# Patient Record
Sex: FEMALE
[De-identification: names, ages, dates, MRNs, and addresses within clinical notes are randomized per-mention and may not be internally consistent; named-entity substitution may affect disease eponyms.]

---

## 2022-01-20 PROBLEM — Z00.00 ENCOUNTER FOR PREVENTIVE HEALTH EXAMINATION: Status: ACTIVE | Noted: 2022-01-20

## 2022-02-28 ENCOUNTER — APPOINTMENT (OUTPATIENT)
Dept: ORTHOPEDIC SURGERY | Facility: CLINIC | Age: 77
End: 2022-02-28

## 2022-02-28 DIAGNOSIS — M25.562 PAIN IN LEFT KNEE: ICD-10-CM

## 2022-04-27 ENCOUNTER — APPOINTMENT (OUTPATIENT)
Dept: ORTHOPEDIC SURGERY | Facility: CLINIC | Age: 77
End: 2022-04-27

## 2022-10-10 ENCOUNTER — APPOINTMENT (OUTPATIENT)
Dept: ORTHOPEDIC SURGERY | Facility: CLINIC | Age: 77
End: 2022-10-10

## 2022-10-10 DIAGNOSIS — M17.0 BILATERAL PRIMARY OSTEOARTHRITIS OF KNEE: ICD-10-CM

## 2022-10-10 DIAGNOSIS — M17.10 UNILATERAL PRIMARY OSTEOARTHRITIS, UNSPECIFIED KNEE: ICD-10-CM

## 2022-10-10 PROCEDURE — 73562 X-RAY EXAM OF KNEE 3: CPT | Mod: 50

## 2022-10-10 PROCEDURE — 99204 OFFICE O/P NEW MOD 45 MIN: CPT

## 2022-10-10 RX ORDER — LIDOCAINE 5% 700 MG/1
5 PATCH TOPICAL
Qty: 30 | Refills: 0 | Status: ACTIVE | COMMUNITY
Start: 2022-10-10 | End: 1900-01-01

## 2022-10-10 NOTE — HISTORY OF PRESENT ILLNESS
[de-identified] : Patient comes in today with her healthcare worker I did talk to the daughter on the phone for 20 minutes today weather has pain in the left knee greater than right knee she has had gel injections with no relief history of diabetes 2 cardiac stents, a injury over a year ago to the tendon of her knee probably quadriceps did not have surgery for this and this was in [place]\par \par  left knee actively extends 25° short of full extension flexion to 110° stable diffuse pain\par \par  x-rays show bone-on-bone lateral compartment of the left knee near bone on bone medial compartment of the right knee patellofemoral spurring on the left greater than right taken today in the office\par \par  they are interested in knee replacements it did discuss with him some of the issues of infection dislocation DVT as well as death this a their family with this since another relative had a knee replacement done, also recommend a get medical and cardiac optimization and evaluation, we did talk about the revisit in a gel injections so will send out for authorization for that he would like both knees done will do a series of 3 Synvisc, they also on lido  patches were sent to the pharmacy, anti-inflammatories as per primary care doctor, the follow-up with our joint specialist for discussion for left knee replacement\par \par

## 2022-12-20 ENCOUNTER — APPOINTMENT (OUTPATIENT)
Dept: ORTHOPEDIC SURGERY | Facility: CLINIC | Age: 77
End: 2022-12-20

## 2023-01-03 ENCOUNTER — APPOINTMENT (OUTPATIENT)
Dept: ORTHOPEDIC SURGERY | Facility: CLINIC | Age: 78
End: 2023-01-03

## 2023-01-10 ENCOUNTER — APPOINTMENT (OUTPATIENT)
Dept: ORTHOPEDIC SURGERY | Facility: CLINIC | Age: 78
End: 2023-01-10

## 2023-01-17 ENCOUNTER — APPOINTMENT (OUTPATIENT)
Dept: ORTHOPEDIC SURGERY | Facility: CLINIC | Age: 78
End: 2023-01-17

## 2023-04-10 ENCOUNTER — RX RENEWAL (OUTPATIENT)
Age: 78
End: 2023-04-10

## 2023-04-10 DIAGNOSIS — E11.65 TYPE 2 DIABETES MELLITUS WITH HYPERGLYCEMIA: ICD-10-CM

## 2023-04-10 RX ORDER — ALCOHOL ANTISEPTIC PADS
33G X 5 MM PADS, MEDICATED (EA) TOPICAL
Qty: 100 | Refills: 2 | Status: ACTIVE | COMMUNITY
Start: 2023-04-10 | End: 1900-01-01

## 2023-04-10 RX ORDER — PEN NEEDLE, DIABETIC 32GX 5/32"
33G X 5 MM NEEDLE, DISPOSABLE MISCELLANEOUS
Qty: 100 | Refills: 2 | Status: ACTIVE | COMMUNITY
Start: 2023-04-10 | End: 1900-01-01

## 2024-02-06 ENCOUNTER — APPOINTMENT (OUTPATIENT)
Dept: ORTHOPEDIC SURGERY | Facility: CLINIC | Age: 79
End: 2024-02-06
Payer: MEDICARE

## 2024-02-06 VITALS — HEIGHT: 62 IN | BODY MASS INDEX: 23.92 KG/M2 | WEIGHT: 130 LBS

## 2024-02-06 DIAGNOSIS — M65.4 RADIAL STYLOID TENOSYNOVITIS [DE QUERVAIN]: ICD-10-CM

## 2024-02-06 PROCEDURE — 73110 X-RAY EXAM OF WRIST: CPT | Mod: LT

## 2024-02-06 PROCEDURE — 99203 OFFICE O/P NEW LOW 30 MIN: CPT | Mod: 25

## 2024-02-06 RX ORDER — OXYBUTYNIN CHLORIDE 2.5 MG/1
TABLET ORAL
Refills: 0 | Status: ACTIVE | COMMUNITY

## 2024-02-06 RX ORDER — ATORVASTATIN CALCIUM 10 MG/1
10 TABLET, FILM COATED ORAL
Refills: 0 | Status: ACTIVE | COMMUNITY

## 2024-02-06 NOTE — IMAGING
[de-identified] : L wrist Mild swelling Tender first dorsal comp Decreased thumb and wrist ROM +Finklesteins No tenderness of the scaphoid.  No tenderness of distal radius.  X-rays of left wrist taken in the office today moderate osteoarthritic changes appreciated throughout.  No acute fractures, subluxations, or dislocations.

## 2024-02-06 NOTE — DISCUSSION/SUMMARY
[de-identified] : My clinical suspicion is high for Dequervain tenosynovitis of the left wrist given the patient's history, physical examination findings and x-ray findings.  She is also probably having a flareup of arthritis of her left wrist as well.  For these reasons, I gave her left thumb spica wrist brace in the office today that was utilized at all times specially when performing motions that which worsen her pain.  She may remove it for showering.  Meloxicam sent to patient pharmacy to be taken as needed for pain.  Confirmed no contraindication to NSAIDs.  Risks and benefits discussed.  Patient expresses full understanding.  Encouraged the patient to rest, ice, compress, and elevate the left wrist moving forwards.  Encouraged very gentle range of motion activity modification.  All questions and concerns addressed to patient's satisfaction. Patient expresses full understanding of treatment plan. I have the patient follow-up with Dr. Veliz in 4 to 6 weeks for repeat evaluation and treatment.  Consider cortisone injections and follow-up visit if patient's symptoms do not improve.

## 2024-02-06 NOTE — HISTORY OF PRESENT ILLNESS
[de-identified] : 78-year-old female here for evaluation of left wrist pain.  Patient reports a gradual aching pain to the first dorsal compartment of the left wrist that which worsens with lifting motions.  She denies any numbness or tingling.  Denies any trauma or falls.

## 2024-03-06 ENCOUNTER — APPOINTMENT (OUTPATIENT)
Dept: ORTHOPEDIC SURGERY | Facility: CLINIC | Age: 79
End: 2024-03-06

## 2024-04-17 ENCOUNTER — APPOINTMENT (OUTPATIENT)
Dept: ORTHOPEDIC SURGERY | Facility: CLINIC | Age: 79
End: 2024-04-17

## 2024-05-28 ENCOUNTER — APPOINTMENT (OUTPATIENT)
Dept: ORTHOPEDIC SURGERY | Facility: CLINIC | Age: 79
End: 2024-05-28

## 2024-10-22 ENCOUNTER — OUTPATIENT (OUTPATIENT)
Dept: OUTPATIENT SERVICES | Facility: HOSPITAL | Age: 79
LOS: 1 days | End: 2024-10-22
Payer: MEDICARE

## 2024-10-22 ENCOUNTER — APPOINTMENT (OUTPATIENT)
Dept: CT IMAGING | Facility: HOSPITAL | Age: 79
End: 2024-10-22

## 2024-10-22 DIAGNOSIS — M17.11 UNILATERAL PRIMARY OSTEOARTHRITIS, RIGHT KNEE: ICD-10-CM

## 2024-10-22 PROCEDURE — 73700 CT LOWER EXTREMITY W/O DYE: CPT | Mod: 26,RT

## 2024-10-22 PROCEDURE — 73700 CT LOWER EXTREMITY W/O DYE: CPT

## 2024-10-28 ENCOUNTER — OUTPATIENT (OUTPATIENT)
Dept: OUTPATIENT SERVICES | Facility: HOSPITAL | Age: 79
LOS: 1 days | End: 2024-10-28
Payer: MEDICARE

## 2024-10-28 VITALS
HEIGHT: 62 IN | DIASTOLIC BLOOD PRESSURE: 67 MMHG | RESPIRATION RATE: 16 BRPM | HEART RATE: 68 BPM | TEMPERATURE: 98 F | SYSTOLIC BLOOD PRESSURE: 153 MMHG | WEIGHT: 136.03 LBS | OXYGEN SATURATION: 97 %

## 2024-10-28 DIAGNOSIS — M17.11 UNILATERAL PRIMARY OSTEOARTHRITIS, RIGHT KNEE: ICD-10-CM

## 2024-10-28 DIAGNOSIS — Z90.710 ACQUIRED ABSENCE OF BOTH CERVIX AND UTERUS: Chronic | ICD-10-CM

## 2024-10-28 DIAGNOSIS — E11.9 TYPE 2 DIABETES MELLITUS WITHOUT COMPLICATIONS: ICD-10-CM

## 2024-10-28 LAB — BLD GP AB SCN SERPL QL: SIGNIFICANT CHANGE UP

## 2024-10-28 RX ORDER — POVIDONE-IODINE 0.07 MG/ML
1 SOLUTION TOPICAL ONCE
Refills: 0 | Status: COMPLETED | OUTPATIENT
Start: 2024-10-29 | End: 2024-10-29

## 2024-10-28 NOTE — H&P PST ADULT - NSANTHOSAYNRD_GEN_A_CORE
No. PRABHA screening performed.  STOP BANG Legend: 0-2 = LOW Risk; 3-4 = INTERMEDIATE Risk; 5-8 = HIGH Risk

## 2024-10-28 NOTE — H&P PST ADULT - MUSCULOSKELETAL COMMENTS
uses walker and cane for ambulation Darius Assisted Right Total Knee Replacement on 10/29/24 with Dr Salazar

## 2024-10-28 NOTE — H&P PST ADULT - PROBLEM SELECTOR PLAN 2
pt instructed to not take any diabetic medication on day of surgery   and to only take half dose of night time insulin night before surgery

## 2024-10-28 NOTE — H&P PST ADULT - ASSESSMENT
79 y.o female with PMHx for DM and Osteoporosis and Arthritis . Now with c/o of worsening right knee pain. on w/u found to have unilateral primary Osteoarthritis right knee and now for schedule Darius Assisted Right Total Knee Replacement on 10/29/24 with Dr Salazar 79 y.o female with  unilateral primary Osteoarthritis right knee and now for schedule Darius Assisted Right Total Knee Replacement on 10/29/24 with Dr Martin PATEL 1

## 2024-10-28 NOTE — H&P PST ADULT - MUSCULOSKELETAL
decreased ROM/decreased ROM due to pain/strength 5/5 bilateral upper extremities details… decreased ROM/decreased ROM due to pain/strength 5/5 bilateral upper extremities/decreased strength

## 2024-10-28 NOTE — H&P PST ADULT - PROBLEM SELECTOR PLAN 1
Pt schedule for Darius Assisted Right Total Knee Replacement on 10/29/24 with Dr Salazar    Labs drawn in PCP - will /fu report   Pt was seen by PCP for Medical clearance- will f/u report     pt was swabbed for MRSA/MSSA -will f/u with result   Pt Instructed on use of Mupirocin if nasal swab positive    Pt was  instructed to stop aspirin/ecotrin and all over the counter medication including vitamins and herbal supplements one week prior to surgery   Instructions given on the use of 4% chlorhexidine wash and Pt verbalized understanding of same   Pt Instructed to have nothing by mouth starting midnight day before surgery  Patient is to expect a phone call day before surgery between the hours of 430- 630pm giving arrival time for surgery   Written and verbal preoperative instructions given to patient with understanding verbalized.   Post surgery instruction given by Edil, booklets and pamphlets also given    Patient today with STOP bang score 3  Low  risk for PRABHA Pt schedule for Darius Assisted Right Total Knee Replacement on 10/29/24 with Dr Salazar    Labs drawn in PCP - will /fu report   Pt was seen by PCP for Medical clearance- will f/u report     pt was swabbed for MRSA/MSSA -will f/u with result   Pt Instructed on use of Mupirocin if nasal swab positive    Pt was  instructed to stop aspirin/ecotrin and all over the counter medication including vitamins and herbal supplements one week prior to surgery   Instructions given on the use of 4% chlorhexidine wash and Pt verbalized understanding of same   Pt Instructed to have nothing by mouth starting midnight day before surgery  Patient is to expect a phone call day before surgery between the hours of 430- 630pm giving arrival time for surgery   Written and verbal preoperative instructions given to patient and daughter Mer  with understanding verbalized  with daughter present .  Post surgery instruction given by Edil, booklets and pamphlets also given    Patient today with STOP bang score 1  Low  risk for PRABHA

## 2024-10-28 NOTE — H&P PST ADULT - HISTORY OF PRESENT ILLNESS
79 y.o female with PMHx for DM and Osteoporosis and Arthritis . Now with c/o of worsening right knee pain. on w/u found to have unilateral primary Osteoarthritis right knee and now for schedule Darius Assisted Right Total Knee Replacement on 10/29/24 with Dr Salazar

## 2024-10-29 ENCOUNTER — TRANSCRIPTION ENCOUNTER (OUTPATIENT)
Age: 79
End: 2024-10-29

## 2024-10-29 ENCOUNTER — INPATIENT (INPATIENT)
Facility: HOSPITAL | Age: 79
LOS: 2 days | Discharge: HOME HEALTH | DRG: 469 | End: 2024-11-01
Attending: ORTHOPAEDIC SURGERY | Admitting: ORTHOPAEDIC SURGERY
Payer: MEDICARE

## 2024-10-29 VITALS
WEIGHT: 136.03 LBS | DIASTOLIC BLOOD PRESSURE: 75 MMHG | RESPIRATION RATE: 16 BRPM | HEART RATE: 63 BPM | OXYGEN SATURATION: 98 % | TEMPERATURE: 98 F | SYSTOLIC BLOOD PRESSURE: 159 MMHG | HEIGHT: 62 IN

## 2024-10-29 DIAGNOSIS — M19.90 UNSPECIFIED OSTEOARTHRITIS, UNSPECIFIED SITE: ICD-10-CM

## 2024-10-29 DIAGNOSIS — E11.9 TYPE 2 DIABETES MELLITUS WITHOUT COMPLICATIONS: ICD-10-CM

## 2024-10-29 DIAGNOSIS — G89.18 OTHER ACUTE POSTPROCEDURAL PAIN: ICD-10-CM

## 2024-10-29 DIAGNOSIS — Z90.710 ACQUIRED ABSENCE OF BOTH CERVIX AND UTERUS: Chronic | ICD-10-CM

## 2024-10-29 DIAGNOSIS — M17.11 UNILATERAL PRIMARY OSTEOARTHRITIS, RIGHT KNEE: ICD-10-CM

## 2024-10-29 DIAGNOSIS — Z96.651 PRESENCE OF RIGHT ARTIFICIAL KNEE JOINT: ICD-10-CM

## 2024-10-29 LAB
A1C WITH ESTIMATED AVERAGE GLUCOSE RESULT: 8.7 % — HIGH (ref 4–5.6)
ANION GAP SERPL CALC-SCNC: 5 MMOL/L — SIGNIFICANT CHANGE UP (ref 5–17)
BLD GP AB SCN SERPL QL: SIGNIFICANT CHANGE UP
BUN SERPL-MCNC: 24 MG/DL — HIGH (ref 7–18)
CALCIUM SERPL-MCNC: 8.5 MG/DL — SIGNIFICANT CHANGE UP (ref 8.4–10.5)
CHLORIDE SERPL-SCNC: 111 MMOL/L — HIGH (ref 96–108)
CO2 SERPL-SCNC: 25 MMOL/L — SIGNIFICANT CHANGE UP (ref 22–31)
CREAT SERPL-MCNC: 0.94 MG/DL — SIGNIFICANT CHANGE UP (ref 0.5–1.3)
EGFR: 62 ML/MIN/1.73M2 — SIGNIFICANT CHANGE UP
ESTIMATED AVERAGE GLUCOSE: 203 MG/DL — HIGH (ref 68–114)
GLUCOSE BLDC GLUCOMTR-MCNC: 113 MG/DL — HIGH (ref 70–99)
GLUCOSE BLDC GLUCOMTR-MCNC: 142 MG/DL — HIGH (ref 70–99)
GLUCOSE BLDC GLUCOMTR-MCNC: 243 MG/DL — HIGH (ref 70–99)
GLUCOSE BLDC GLUCOMTR-MCNC: 254 MG/DL — HIGH (ref 70–99)
GLUCOSE BLDC GLUCOMTR-MCNC: 289 MG/DL — HIGH (ref 70–99)
GLUCOSE SERPL-MCNC: 118 MG/DL — HIGH (ref 70–99)
HCT VFR BLD CALC: 29.8 % — LOW (ref 34.5–45)
HGB BLD-MCNC: 10.2 G/DL — LOW (ref 11.5–15.5)
MCHC RBC-ENTMCNC: 29.9 PG — SIGNIFICANT CHANGE UP (ref 27–34)
MCHC RBC-ENTMCNC: 34.2 G/DL — SIGNIFICANT CHANGE UP (ref 32–36)
MCV RBC AUTO: 87.4 FL — SIGNIFICANT CHANGE UP (ref 80–100)
MRSA PCR RESULT.: SIGNIFICANT CHANGE UP
NRBC # BLD: 0 /100 WBCS — SIGNIFICANT CHANGE UP (ref 0–0)
PLATELET # BLD AUTO: 135 K/UL — LOW (ref 150–400)
POTASSIUM SERPL-MCNC: 3.8 MMOL/L — SIGNIFICANT CHANGE UP (ref 3.5–5.3)
POTASSIUM SERPL-SCNC: 3.8 MMOL/L — SIGNIFICANT CHANGE UP (ref 3.5–5.3)
RBC # BLD: 3.41 M/UL — LOW (ref 3.8–5.2)
RBC # FLD: 13.2 % — SIGNIFICANT CHANGE UP (ref 10.3–14.5)
S AUREUS DNA NOSE QL NAA+PROBE: SIGNIFICANT CHANGE UP
SODIUM SERPL-SCNC: 141 MMOL/L — SIGNIFICANT CHANGE UP (ref 135–145)
WBC # BLD: 5.7 K/UL — SIGNIFICANT CHANGE UP (ref 3.8–10.5)
WBC # FLD AUTO: 5.7 K/UL — SIGNIFICANT CHANGE UP (ref 3.8–10.5)

## 2024-10-29 PROCEDURE — 86901 BLOOD TYPING SEROLOGIC RH(D): CPT

## 2024-10-29 PROCEDURE — 36415 COLL VENOUS BLD VENIPUNCTURE: CPT

## 2024-10-29 PROCEDURE — 87640 STAPH A DNA AMP PROBE: CPT

## 2024-10-29 PROCEDURE — G0463: CPT

## 2024-10-29 PROCEDURE — 83036 HEMOGLOBIN GLYCOSYLATED A1C: CPT

## 2024-10-29 PROCEDURE — 88305 TISSUE EXAM BY PATHOLOGIST: CPT | Mod: 26

## 2024-10-29 PROCEDURE — 86850 RBC ANTIBODY SCREEN: CPT

## 2024-10-29 PROCEDURE — 99222 1ST HOSP IP/OBS MODERATE 55: CPT

## 2024-10-29 PROCEDURE — 87641 MR-STAPH DNA AMP PROBE: CPT

## 2024-10-29 PROCEDURE — 86900 BLOOD TYPING SEROLOGIC ABO: CPT

## 2024-10-29 PROCEDURE — 73560 X-RAY EXAM OF KNEE 1 OR 2: CPT | Mod: 26,RT,77

## 2024-10-29 PROCEDURE — 88311 DECALCIFY TISSUE: CPT | Mod: 26

## 2024-10-29 DEVICE — ARISTA 3GR: Type: IMPLANTABLE DEVICE | Status: FUNCTIONAL

## 2024-10-29 DEVICE — COMP FEM TRIATHLON PS SZ 2 RT: Type: IMPLANTABLE DEVICE | Status: FUNCTIONAL

## 2024-10-29 DEVICE — FEM DIST FIXATION PEG MOD TKS: Type: IMPLANTABLE DEVICE | Status: FUNCTIONAL

## 2024-10-29 DEVICE — STRYKER PIN 1/8 X 3.5" LONG: Type: IMPLANTABLE DEVICE | Status: FUNCTIONAL

## 2024-10-29 DEVICE — INSERT TIB TS PLUS X3 POLY SZ 3 11MM: Type: IMPLANTABLE DEVICE | Status: FUNCTIONAL

## 2024-10-29 DEVICE — CEMENT SIMPLEX P 40GM: Type: IMPLANTABLE DEVICE | Status: FUNCTIONAL

## 2024-10-29 DEVICE — BASEPLATE TIB UNIV TRIATHLON SZ 3: Type: IMPLANTABLE DEVICE | Status: FUNCTIONAL

## 2024-10-29 DEVICE — MAKO BONE PIN 4MM X 110MM: Type: IMPLANTABLE DEVICE | Status: FUNCTIONAL

## 2024-10-29 DEVICE — MAKO BONE PIN 4MM X 140MM: Type: IMPLANTABLE DEVICE | Status: FUNCTIONAL

## 2024-10-29 DEVICE — COMP PATELLA ASYMMETRIC X3 32X10MM: Type: IMPLANTABLE DEVICE | Status: FUNCTIONAL

## 2024-10-29 RX ORDER — CELECOXIB 100 MG
200 CAPSULE ORAL DAILY
Refills: 0 | Status: DISCONTINUED | OUTPATIENT
Start: 2024-10-30 | End: 2024-11-01

## 2024-10-29 RX ORDER — FAMOTIDINE 10 MG/ML
20 INJECTION INTRAVENOUS EVERY 12 HOURS
Refills: 0 | Status: DISCONTINUED | OUTPATIENT
Start: 2024-10-29 | End: 2024-11-01

## 2024-10-29 RX ORDER — KETOROLAC TROMETHAMINE 30 MG/ML
15 INJECTION INTRAMUSCULAR; INTRAVENOUS EVERY 6 HOURS
Refills: 0 | Status: DISCONTINUED | OUTPATIENT
Start: 2024-10-29 | End: 2024-11-01

## 2024-10-29 RX ORDER — INSULIN GLARGINE,HUM.REC.ANLOG 100/ML
0 VIAL (ML) SUBCUTANEOUS
Refills: 0 | DISCHARGE

## 2024-10-29 RX ORDER — PIOGLITAZONE 45 MG/1
1 TABLET ORAL
Refills: 0 | DISCHARGE

## 2024-10-29 RX ORDER — POLYETHYLENE GLYCOL 3350 17 G/17G
17 POWDER, FOR SOLUTION ORAL AT BEDTIME
Refills: 0 | Status: DISCONTINUED | OUTPATIENT
Start: 2024-10-29 | End: 2024-11-01

## 2024-10-29 RX ORDER — HYDROMORPHONE HCL/0.9% NACL/PF 6 MG/30 ML
0.5 PATIENT CONTROLLED ANALGESIA SYRINGE INTRAVENOUS
Refills: 0 | Status: DISCONTINUED | OUTPATIENT
Start: 2024-10-29 | End: 2024-10-29

## 2024-10-29 RX ORDER — INSULIN LISPRO 100/ML
5 VIAL (ML) SUBCUTANEOUS ONCE
Refills: 0 | Status: DISCONTINUED | OUTPATIENT
Start: 2024-10-29 | End: 2024-10-29

## 2024-10-29 RX ORDER — OXYBUTYNIN CHLORIDE 5 MG/1
5 TABLET ORAL DAILY
Refills: 0 | Status: DISCONTINUED | OUTPATIENT
Start: 2024-10-29 | End: 2024-10-29

## 2024-10-29 RX ORDER — GABAPENTIN 300 MG/1
300 CAPSULE ORAL DAILY
Refills: 0 | Status: DISCONTINUED | OUTPATIENT
Start: 2024-10-29 | End: 2024-10-29

## 2024-10-29 RX ORDER — SENNA 187 MG
2 TABLET ORAL AT BEDTIME
Refills: 0 | Status: DISCONTINUED | OUTPATIENT
Start: 2024-10-29 | End: 2024-11-01

## 2024-10-29 RX ORDER — INSULIN REG, HUM S-S BUFF 100/ML
5 VIAL (ML) INJECTION ONCE
Refills: 0 | Status: DISCONTINUED | OUTPATIENT
Start: 2024-10-29 | End: 2024-10-29

## 2024-10-29 RX ORDER — INSULIN LISPRO 100/ML
5 VIAL (ML) SUBCUTANEOUS ONCE
Refills: 0 | Status: COMPLETED | OUTPATIENT
Start: 2024-10-29 | End: 2024-10-29

## 2024-10-29 RX ORDER — TRAMADOL HYDROCHLORIDE 50 MG/1
50 TABLET, COATED ORAL EVERY 8 HOURS
Refills: 0 | Status: DISCONTINUED | OUTPATIENT
Start: 2024-10-29 | End: 2024-11-01

## 2024-10-29 RX ORDER — B-COMPLEX WITH VITAMIN C
1 VIAL (ML) INJECTION DAILY
Refills: 0 | Status: DISCONTINUED | OUTPATIENT
Start: 2024-10-29 | End: 2024-11-01

## 2024-10-29 RX ORDER — ACETAMINOPHEN 500 MG
1000 TABLET ORAL EVERY 8 HOURS
Refills: 0 | Status: COMPLETED | OUTPATIENT
Start: 2024-10-29 | End: 2024-10-30

## 2024-10-29 RX ORDER — CHLORHEXIDINE GLUCONATE 40 MG/ML
1 SOLUTION TOPICAL ONCE
Refills: 0 | Status: COMPLETED | OUTPATIENT
Start: 2024-10-29 | End: 2024-10-29

## 2024-10-29 RX ORDER — GLUCAGON INJECTION, SOLUTION 1 MG/.2ML
1 INJECTION, SOLUTION SUBCUTANEOUS ONCE
Refills: 0 | Status: DISCONTINUED | OUTPATIENT
Start: 2024-10-29 | End: 2024-11-01

## 2024-10-29 RX ORDER — OXYCODONE HYDROCHLORIDE 30 MG/1
5 TABLET ORAL
Refills: 0 | Status: DISCONTINUED | OUTPATIENT
Start: 2024-10-29 | End: 2024-10-29

## 2024-10-29 RX ORDER — SODIUM CHLORIDE 9 MG/ML
1000 INJECTION, SOLUTION INTRAMUSCULAR; INTRAVENOUS; SUBCUTANEOUS
Refills: 0 | Status: DISCONTINUED | OUTPATIENT
Start: 2024-10-29 | End: 2024-11-01

## 2024-10-29 RX ORDER — ACETAMINOPHEN 500 MG
975 TABLET ORAL ONCE
Refills: 0 | Status: COMPLETED | OUTPATIENT
Start: 2024-10-29 | End: 2024-10-29

## 2024-10-29 RX ORDER — ENOXAPARIN SODIUM 40MG/0.4ML
30 SYRINGE (ML) SUBCUTANEOUS EVERY 12 HOURS
Refills: 0 | Status: DISCONTINUED | OUTPATIENT
Start: 2024-10-29 | End: 2024-11-01

## 2024-10-29 RX ORDER — OXYCODONE HYDROCHLORIDE 30 MG/1
5 TABLET ORAL EVERY 4 HOURS
Refills: 0 | Status: DISCONTINUED | OUTPATIENT
Start: 2024-10-29 | End: 2024-11-01

## 2024-10-29 RX ORDER — ONDANSETRON HYDROCHLORIDE 2 MG/ML
4 INJECTION, SOLUTION INTRAMUSCULAR; INTRAVENOUS ONCE
Refills: 0 | Status: DISCONTINUED | OUTPATIENT
Start: 2024-10-29 | End: 2024-10-29

## 2024-10-29 RX ORDER — HYDROMORPHONE HCL/0.9% NACL/PF 6 MG/30 ML
1 PATIENT CONTROLLED ANALGESIA SYRINGE INTRAVENOUS
Refills: 0 | Status: DISCONTINUED | OUTPATIENT
Start: 2024-10-29 | End: 2024-10-29

## 2024-10-29 RX ORDER — MAGNESIUM HYDROXIDE 1200 MG/15ML
30 SUSPENSION ORAL DAILY
Refills: 0 | Status: DISCONTINUED | OUTPATIENT
Start: 2024-10-29 | End: 2024-11-01

## 2024-10-29 RX ORDER — HYDRALAZINE HYDROCHLORIDE 50 MG/1
5 TABLET, FILM COATED ORAL ONCE
Refills: 0 | Status: COMPLETED | OUTPATIENT
Start: 2024-10-29 | End: 2024-10-29

## 2024-10-29 RX ORDER — ONDANSETRON HYDROCHLORIDE 2 MG/ML
4 INJECTION, SOLUTION INTRAMUSCULAR; INTRAVENOUS EVERY 6 HOURS
Refills: 0 | Status: DISCONTINUED | OUTPATIENT
Start: 2024-10-29 | End: 2024-11-01

## 2024-10-29 RX ORDER — CEFAZOLIN SODIUM 1 G
2000 VIAL (EA) INJECTION EVERY 8 HOURS
Refills: 0 | Status: COMPLETED | OUTPATIENT
Start: 2024-10-29 | End: 2024-10-30

## 2024-10-29 RX ORDER — GABAPENTIN 300 MG/1
0 CAPSULE ORAL
Refills: 0 | DISCHARGE

## 2024-10-29 RX ORDER — FERROUS SULFATE 325(65) MG
325 TABLET ORAL DAILY
Refills: 0 | Status: DISCONTINUED | OUTPATIENT
Start: 2024-10-29 | End: 2024-11-01

## 2024-10-29 RX ORDER — OXYBUTYNIN CHLORIDE 5 MG/1
1 TABLET ORAL
Refills: 0 | DISCHARGE

## 2024-10-29 RX ORDER — CHLORHEXIDINE GLUCONATE 40 MG/ML
1 SOLUTION TOPICAL ONCE
Refills: 0 | Status: DISCONTINUED | OUTPATIENT
Start: 2024-10-29 | End: 2024-10-29

## 2024-10-29 RX ORDER — FOLIC ACID 1 MG/1
1 TABLET ORAL DAILY
Refills: 0 | Status: DISCONTINUED | OUTPATIENT
Start: 2024-10-29 | End: 2024-11-01

## 2024-10-29 RX ORDER — ASCORBIC ACID 500 MG
500 TABLET ORAL
Refills: 0 | Status: DISCONTINUED | OUTPATIENT
Start: 2024-10-29 | End: 2024-11-01

## 2024-10-29 RX ORDER — TRAMADOL HYDROCHLORIDE 50 MG/1
50 TABLET, COATED ORAL ONCE
Refills: 0 | Status: DISCONTINUED | OUTPATIENT
Start: 2024-10-29 | End: 2024-10-29

## 2024-10-29 RX ORDER — INSULIN LISPRO 100/ML
VIAL (ML) SUBCUTANEOUS
Refills: 0 | Status: DISCONTINUED | OUTPATIENT
Start: 2024-10-29 | End: 2024-11-01

## 2024-10-29 RX ORDER — SODIUM CHLORIDE 9 MG/ML
3 INJECTION, SOLUTION INTRAMUSCULAR; INTRAVENOUS; SUBCUTANEOUS EVERY 8 HOURS
Refills: 0 | Status: DISCONTINUED | OUTPATIENT
Start: 2024-10-29 | End: 2024-10-29

## 2024-10-29 RX ADMIN — Medication 4: at 22:23

## 2024-10-29 RX ADMIN — OXYCODONE HYDROCHLORIDE 5 MILLIGRAM(S): 30 TABLET ORAL at 19:17

## 2024-10-29 RX ADMIN — TRAMADOL HYDROCHLORIDE 50 MILLIGRAM(S): 50 TABLET, COATED ORAL at 22:23

## 2024-10-29 RX ADMIN — Medication 1000 MILLIGRAM(S): at 22:23

## 2024-10-29 RX ADMIN — KETOROLAC TROMETHAMINE 15 MILLIGRAM(S): 30 INJECTION INTRAMUSCULAR; INTRAVENOUS at 17:05

## 2024-10-29 RX ADMIN — SODIUM CHLORIDE 3 MILLILITER(S): 9 INJECTION, SOLUTION INTRAMUSCULAR; INTRAVENOUS; SUBCUTANEOUS at 08:31

## 2024-10-29 RX ADMIN — POVIDONE-IODINE 1 APPLICATION(S): 0.07 SOLUTION TOPICAL at 08:31

## 2024-10-29 RX ADMIN — OXYCODONE HYDROCHLORIDE 5 MILLIGRAM(S): 30 TABLET ORAL at 18:17

## 2024-10-29 RX ADMIN — FAMOTIDINE 20 MILLIGRAM(S): 10 INJECTION INTRAVENOUS at 17:10

## 2024-10-29 RX ADMIN — Medication 30 MILLIGRAM(S): at 22:22

## 2024-10-29 RX ADMIN — POLYETHYLENE GLYCOL 3350 17 GRAM(S): 17 POWDER, FOR SOLUTION ORAL at 22:23

## 2024-10-29 RX ADMIN — HYDRALAZINE HYDROCHLORIDE 5 MILLIGRAM(S): 50 TABLET, FILM COATED ORAL at 15:14

## 2024-10-29 RX ADMIN — CHLORHEXIDINE GLUCONATE 1 APPLICATION(S): 40 SOLUTION TOPICAL at 08:35

## 2024-10-29 RX ADMIN — Medication 2 TABLET(S): at 22:23

## 2024-10-29 RX ADMIN — Medication 975 MILLIGRAM(S): at 08:22

## 2024-10-29 RX ADMIN — Medication 500 MILLIGRAM(S): at 17:11

## 2024-10-29 RX ADMIN — SODIUM CHLORIDE 110 MILLILITER(S): 9 INJECTION, SOLUTION INTRAMUSCULAR; INTRAVENOUS; SUBCUTANEOUS at 17:06

## 2024-10-29 RX ADMIN — TRAMADOL HYDROCHLORIDE 50 MILLIGRAM(S): 50 TABLET, COATED ORAL at 08:36

## 2024-10-29 RX ADMIN — Medication 5 UNIT(S): at 09:25

## 2024-10-29 RX ADMIN — Medication 100 MILLIGRAM(S): at 19:14

## 2024-10-29 NOTE — CONSULT NOTE ADULT - SUBJECTIVE AND OBJECTIVE BOX
Source of information: TERENCE RUBY, Chart review  Patient language: English  : n/a    HPI:  Patient is a 79 year old female with a PMHx significant for DM and primary OA of the right knee. Patient is admitted for scheduled Right Total Knee Replacement with Dr. Salazar now POD 0. Pain service consulted for acute postoperative pain. Pt seen and examined at bedside, this afternoon. At time of assessment, patient laying in bed grimacing in pain, reports pain score 10/10 SCALE USED: (1-10 VNRS). Primary RN at bedside to administer PRN pain medication for severe pain as ordered. Pt describes pain as localized to right knee joint constant and throbbing in quality. The pain is currently alleviated by pain medication and is exacerbated by movement. Pt tolerating sips of water. Denies lethargy, chest pain, SOB, nausea, vomiting, constipation. Reports last BM 2 days ago. Patient stated goal for pain control: to be able to take deep breaths, get out of bed to chair and ambulate with tolerable pain control. Pt denies taking medications for pain at home.     PAST MEDICAL & SURGICAL HISTORY:  DM (diabetes mellitus)    OA (osteoarthritis)    H/O total hysterectomy    History of cholecystectomy  FAMILY HISTORY:    Social History:  [x] Denies ETOH use, illicit drug use and smoking    Allergies    No Known Allergies    Intolerances    MEDICATIONS  (STANDING):  acetaminophen     Tablet .. 1000 milliGRAM(s) Oral every 8 hours  ascorbic acid 500 milliGRAM(s) Oral two times a day  ceFAZolin   IVPB 2000 milliGRAM(s) IV Intermittent every 8 hours  dextrose 5%. 1000 milliLiter(s) (50 mL/Hr) IV Continuous <Continuous>  dextrose 5%. 1000 milliLiter(s) (100 mL/Hr) IV Continuous <Continuous>  dextrose 50% Injectable 25 Gram(s) IV Push once  dextrose 50% Injectable 12.5 Gram(s) IV Push once  dextrose 50% Injectable 25 Gram(s) IV Push once  enoxaparin Injectable 30 milliGRAM(s) SubCutaneous every 12 hours  famotidine    Tablet 20 milliGRAM(s) Oral every 12 hours  ferrous    sulfate 325 milliGRAM(s) Oral daily  folic acid 1 milliGRAM(s) Oral daily  glucagon  Injectable 1 milliGRAM(s) IntraMuscular once  insulin lispro (ADMELOG) corrective regimen sliding scale   SubCutaneous three times a day before meals  multivitamin 1 Tablet(s) Oral daily  polyethylene glycol 3350 17 Gram(s) Oral at bedtime  senna 2 Tablet(s) Oral at bedtime  sodium chloride 0.9%. 1000 milliLiter(s) (110 mL/Hr) IV Continuous <Continuous>  traMADol 50 milliGRAM(s) Oral every 8 hours    MEDICATIONS  (PRN):  dextrose Oral Gel 15 Gram(s) Oral once PRN Blood Glucose LESS THAN 70 milliGRAM(s)/deciliter  ketorolac   Injectable 15 milliGRAM(s) IV Push every 6 hours PRN Severe Pain (7 - 10)  magnesium hydroxide Suspension 30 milliLiter(s) Oral daily PRN Constipation  ondansetron Injectable 4 milliGRAM(s) IV Push every 6 hours PRN Nausea and/or Vomiting  oxyCODONE    IR 5 milliGRAM(s) Oral every 4 hours PRN Moderate Pain (4 - 6)    Vital Signs Last 24 Hrs  T(C): 36.6 (29 Oct 2024 16:30), Max: 36.6 (29 Oct 2024 14:43)  T(F): 97.9 (29 Oct 2024 16:30), Max: 97.9 (29 Oct 2024 14:43)  HR: 70 (29 Oct 2024 16:30) (54 - 72)  BP: 155/75 (29 Oct 2024 16:30) (111/77 - 198/89)  BP(mean): 101 (29 Oct 2024 16:30) (85 - 130)  RR: 18 (29 Oct 2024 16:30) (10 - 18)  SpO2: 100% (29 Oct 2024 16:30) (98% - 100%)    Parameters below as of 29 Oct 2024 16:30  Patient On (Oxygen Delivery Method): room air    LABS: Reviewed.                          10.2   5.70  )-----------( 135      ( 29 Oct 2024 13:40 )             29.8     10-29    141  |  111[H]  |  24[H]  ----------------------------<  118[H]  3.8   |  25  |  0.94    Ca    8.5      29 Oct 2024 13:40    Urinalysis Basic - ( 29 Oct 2024 13:40 )    Color: x / Appearance: x / SG: x / pH: x  Gluc: 118 mg/dL / Ketone: x  / Bili: x / Urobili: x   Blood: x / Protein: x / Nitrite: x   Leuk Esterase: x / RBC: x / WBC x   Sq Epi: x / Non Sq Epi: x / Bacteria: x    CAPILLARY BLOOD GLUCOSE    POCT Blood Glucose.: 142 mg/dL (29 Oct 2024 16:46)  POCT Blood Glucose.: 113 mg/dL (29 Oct 2024 13:41)  POCT Blood Glucose.: 254 mg/dL (29 Oct 2024 09:52)  POCT Blood Glucose.: 289 mg/dL (29 Oct 2024 07:35)    Radiology: None to be Reviewed.     ORT Score -   Family Hx of substance abuse	Female	      Male  Alcohol 	                                           1                     3  Illegal drugs	                                   2                     3  Rx drugs                                           4 	                  4  Personal Hx of substance abuse		  Alcohol 	                                          3	                  3  Illegal drugs                                     4	                  4  Rx drugs                                            5 	                  5  Age between 16- 45 years	           1                     1  hx preadolescent sexual abuse	   3 	                  0  Psychological disease		  ADD, OCD, bipolar, schizophrenia   2	          2  Depression                                           1 	          1  Total: 0    a score of 3 or lower indicates low risk for opioid abuse		  a score of 4-7 indicates moderate risk for opioid abuse		  a score of 8 or higher indicates high risk for opioid abuse  	  REVIEW OF SYSTEMS:  CONSTITUTIONAL: No fever or fatigue  HEENT:  No difficulty hearing, no change in vision  NECK: No pain or stiffness  RESPIRATORY: No cough, wheezing, chills or hemoptysis; No shortness of breath  CARDIOVASCULAR: No chest pain, palpitations, dizziness, or leg swelling  GASTROINTESTINAL: No loss of appetite, decreased PO intake. No abdominal or epigastric pain. No nausea, vomiting; No diarrhea or constipation.   GENITOURINARY: No dysuria, frequency, hematuria, retention or incontinence  MUSCULOSKELETAL: + right knee pain; No back pain, no upper or lower motor strength weakness, no saddle anesthesia, bowel/bladder incontinence, no falls   NEURO: No headaches, No numbness/tingling b/l LE, No weakness  ENDOCRINE: No polyuria, polydipsia, heat or cold intolerance; No hair loss  PSYCHIATRIC: No depression, anxiety or difficulty sleeping    PHYSICAL EXAM:  GENERAL:  Alert & Oriented X4, cooperative, NAD, Good concentration  RESPIRATORY: Respirations even and unlabored. Clear to auscultation bilaterally  CARDIOVASCULAR: Normal S1/S2, regular rate and rhythm  GASTROINTESTINAL:  Soft, Nontender, Nondistended; Bowel sounds present  PERIPHERAL VASCULAR:  Extremities warm. 2+ Peripheral Pulses, No cyanosis, No calf tenderness  MUSCULOSKELETAL: Motor Strength 5/5 B/L upper and lower extremities; moves all extremities equally against gravity; decreased RLE ROM due to pain; + right knee tenderness on palpation   SKIN: Warm, dry, + right knee primary C/D/I     Risk factors associated with adverse outcomes related to opioid treatment  [ ] Concurrent benzodiazepine use  [ ] History/ Active substance use or alcohol use disorder  [ ] Psychiatric co-morbidity  [ ] Sleep apnea  [ ] COPD  [ ] BMI> 35  [ ] Liver dysfunction  [ ] Renal dysfunction  [ ] CHF  [ ] Smoker  [x] Age > 60 years    [x]  [place]  Reviewed and Copied to Chart. See below.    Plan of care and goal oriented pain management treatment options were discussed with patient and /or primary care giver; all questions and concerns were addressed and care was aligned with patient's wishes.    Educated patient on goal oriented pain management treatment options

## 2024-10-29 NOTE — DISCHARGE NOTE PROVIDER - NSDCCPTREATMENT_GEN_ALL_CORE_FT
PRINCIPAL PROCEDURE  Procedure: Right total knee arthroplasty  Findings and Treatment: s/p right total knee replacement on 10/29/24  If patient has drainage from the wound, please contact the surgeon's office.   If patient has intractable pain, please contact the surgeon's office.   If excessively warm at the incision site is noted, please contact the surgeon's office.   If redness is noted, especially spreading, please contact the surgeon's office.   If patient has fever over 101F please return to the hospital through the Emergency Room.   If demian blood is saturating the dressing, please return to the hospital through the Emergency Room.  If drainage of fluid or pus is noted, please return to the hospital through the Emergency Room.

## 2024-10-29 NOTE — ASU PATIENT PROFILE, ADULT - NS PRO PASSIVE SMOKE EXP
----- Message from Gonzales Hawkins DO sent at 5/22/2024  8:00 AM EDT -----  Please notify pt her Vit D was within normal limits and she does not need any additional medications      No

## 2024-10-29 NOTE — DISCHARGE NOTE PROVIDER - NSDCFUADDAPPT_GEN_ALL_CORE_FT
APPTS ARE READY TO BE MADE: [X] YES    Best Family or Patient Contact (if needed):  N/A  Additional Information about above appointments (if needed):  N/A  Other comments or requests:    APPTS ARE READY TO BE MADE: [X] YES    Best Family or Patient Contact (if needed):  N/A  Additional Information about above appointments (if needed):  N/A  Other comments or requests:       Patient is being discharged to Rehab. Caregiver will arrange follow up.

## 2024-10-29 NOTE — ASU PATIENT PROFILE, ADULT - BLOOD TRANSFUSION, PREVIOUS, PROFILE
"ED Provider Note    CHIEF COMPLAINT  Chief Complaint   Patient presents with    Dizziness     Has been dizzy all day, has a brain mass, was concerned because how she felt when got diagnosed, blurred vision, and everything is spinning    Chest Pressure     Heavy sensation since she had a heart attack on 1/17/23 like \"I cant take a deep breath\"    Headache       EXTERNAL RECORDS REVIEWED  Patient has an encounter to [facility] [facility] emergency department with chest pain.  Determined to have a chest wall injury.  Patient was admitted to [facility] with syncope in early January.  No alarming findings were identified.  Patient underwent echocardiogram head telemetry.  She had a CT scan of her head.  She was referred to cardiology.    HPI/ROS      Lelia Segura is a 55 y.o. female who presents because of chest pain.  Started yesterday.  Has been constant.  Feels like a heaviness.  She points to her central epigastrium as the primary location.  She reports she has had a heart attack and went to [facility] but on review of the chart she was there for syncope she had negative serial troponins and echocardiogram.  She has associated shortness of breath and feels dizzy weak anxious.  Reports that she had a brain mass.  Most recent CT scan in 2018 in my system was negative; however, CT scan of the head in January of this year was reportedly negative at [facility].  She does suffer from chronic headaches and chronic back and neck pain.  No acute weakness numbness neurologic symptoms she is followed by pain management.  She takes muscle relaxant and Norco on a regular basis.  Patient denies any abdominal pain or vomiting.  No diarrhea.  No dysuria hematuria frequency.  .    PAST MEDICAL HISTORY   has a past medical history of Cancer (HCC), Depression, Hypothyroidism, Overdose, Psychiatric disorder, and PTSD (post-traumatic stress disorder).    SURGICAL HISTORY  patient denies any surgical history    FAMILY HISTORY  History " "reviewed. No pertinent family history.    SOCIAL HISTORY  Social History     Tobacco Use    Smoking status: Every Day     Packs/day: 0.50     Types: Cigarettes    Smokeless tobacco: Never   Vaping Use    Vaping Use: Every day    Substances: Nicotine, Flavoring   Substance and Sexual Activity    Alcohol use: Yes     Comment: ocassionally    Drug use: No    Sexual activity: Not on file       CURRENT MEDICATIONS  Home Medications       Reviewed by Meera Yun R.N. (Registered Nurse) on 03/15/23 at 0332  Med List Status: Not Addressed     Medication Last Dose Status   ACYCLOVIR PO  Active   levothyroxine (SYNTHROID) 125 MCG Tab  Active                    ALLERGIES  No Known Allergies    PHYSICAL EXAM  VITAL SIGNS: /72   Pulse 89   Temp 36.3 °C (97.3 °F) (Temporal)   Resp 14   Ht 1.702 m (5' 7\")   Wt 76.6 kg (168 lb 14 oz)   LMP  (LMP Unknown)   SpO2 95%   BMI 26.45 kg/m²    Constitutional: Awake and alert  HENT: Normal inspection  Eyes: Normal inspection  Neck: Grossly normal range of motion.  Cardiovascular: Normal heart rate, Normal rhythm.  Symmetric peripheral pulses.   Thorax & Lungs: No respiratory distress, No wheezing, No rales, No rhonchi, No chest tenderness.   Abdomen: Bowel sounds normal, soft, non-distended, nontender, no mass  Extremities: No clubbing, cyanosis, edema, no Homans or cords.  Neurologic: Grossly normal   Psychiatric: Normal for situation    DIAGNOSTIC STUDIES / PROCEDURES  EKG/LABS  Results for orders placed or performed during the hospital encounter of 03/15/23   CBC with Differential   Result Value Ref Range    WBC 6.4 4.8 - 10.8 K/uL    RBC 4.71 4.20 - 5.40 M/uL    Hemoglobin 14.0 12.0 - 16.0 g/dL    Hematocrit 42.4 37.0 - 47.0 %    MCV 90.0 81.4 - 97.8 fL    MCH 29.7 27.0 - 33.0 pg    MCHC 33.0 (L) 33.6 - 35.0 g/dL    RDW 43.7 35.9 - 50.0 fL    Platelet Count 307 164 - 446 K/uL    MPV 9.5 9.0 - 12.9 fL    Neutrophils-Polys 45.70 44.00 - 72.00 %    Lymphocytes 41.50 (H) " 22.00 - 41.00 %    Monocytes 9.20 0.00 - 13.40 %    Eosinophils 2.30 0.00 - 6.90 %    Basophils 0.80 0.00 - 1.80 %    Immature Granulocytes 0.50 0.00 - 0.90 %    Nucleated RBC 0.00 /100 WBC    Neutrophils (Absolute) 2.94 2.00 - 7.15 K/uL    Lymphs (Absolute) 2.67 1.00 - 4.80 K/uL    Monos (Absolute) 0.59 0.00 - 0.85 K/uL    Eos (Absolute) 0.15 0.00 - 0.51 K/uL    Baso (Absolute) 0.05 0.00 - 0.12 K/uL    Immature Granulocytes (abs) 0.03 0.00 - 0.11 K/uL    NRBC (Absolute) 0.00 K/uL   Complete Metabolic Panel (CMP)   Result Value Ref Range    Sodium 139 135 - 145 mmol/L    Potassium 4.3 3.6 - 5.5 mmol/L    Chloride 102 96 - 112 mmol/L    Co2 26 20 - 33 mmol/L    Anion Gap 11.0 7.0 - 16.0    Glucose 67 65 - 99 mg/dL    Bun 15 8 - 22 mg/dL    Creatinine 0.76 0.50 - 1.40 mg/dL    Calcium 9.1 8.5 - 10.5 mg/dL    AST(SGOT) 18 12 - 45 U/L    ALT(SGPT) 13 2 - 50 U/L    Alkaline Phosphatase 90 30 - 99 U/L    Total Bilirubin 0.2 0.1 - 1.5 mg/dL    Albumin 4.2 3.2 - 4.9 g/dL    Total Protein 6.8 6.0 - 8.2 g/dL    Globulin 2.6 1.9 - 3.5 g/dL    A-G Ratio 1.6 g/dL   Troponins NOW   Result Value Ref Range    Troponin T 7 6 - 19 ng/L   HCG Qual Serum   Result Value Ref Range    Beta-Hcg Qualitative Serum Negative Negative   LIPASE   Result Value Ref Range    Lipase 32 11 - 82 U/L   CORRECTED CALCIUM   Result Value Ref Range    Correct Calcium 8.9 8.5 - 10.5 mg/dL   ESTIMATED GFR   Result Value Ref Range    GFR (CKD-EPI) 93 >60 mL/min/1.73 m 2   EKG (NOW)   Result Value Ref Range    Report       [facility] Emergency Dept.    Test Date:  2023-03-15  Pt Name:    ALIS MENDEZ            Department: ER  MRN:        [id number]                      Room:  Gender:     Female                       Technician: [id number]  :        [id number]                   Requested By:ER TRIAGE PROTOCOL  Order #:    [id number]                    Reading MD: SUKHJINDER KYLE MD    Measurements  Intervals                                 Axis  Rate:       99                           P:          72  MT:         150                          QRS:        7  QRSD:       75                           T:          14  QT:         357  QTc:        459    Interpretive Statements  Sinus rhythm  Compared to ECG 11/05/2016 23:51:19  Sinus tachycardia no longer present  First degree AV block no longer present  Atrial abnormality no longer present  Right-axis deviation no longer present  Electronically Signed On 3- 4:26:18 PDT by SUKHJINDER KYLE MD        I have independently interpreted this EKG  Rhythm strip interpretation-sinus rhythm        RADIOLOGY  I have independently interpreted the diagnostic imaging associated with this visit and am waiting the final reading from the radiologist.   My preliminary interpretation is as follows: Chest x-ray is negative for acute findings  Radiologist interpretation:   DX-CHEST-PORTABLE (1 VIEW)   Final Result         1.  No acute cardiopulmonary disease.            COURSE & MEDICAL DECISION MAKING    ED Observation Status? Yes; I am placing the patient in to an observation status due to a diagnostic uncertainty as well as therapeutic intensity. Patient placed in observation status at 0400 AM, 3/15/2023.     Observation plan is as follows: Treat symptoms.  Cardiac telemetry.  Serial troponins cardiac evaluation.    Upon Reevaluation, the patient's condition has: Improved; and will be discharged.    Patient discharged from ED Observation status at 6:50 AM 3/15/2023.     INITIAL ASSESSMENT, COURSE AND PLAN  Care Narrative: Patient presents with chest pain.  She has multiple complaints.  Complex history.  Extensive work-up was undertaken.  Initial EKG does not show acute ischemia.  No clinical suggestion of aortic dissection and pulmonary embolism.  She does have some upper abdominal discomfort.  Gastritis esophagitis is a possibility.  No clinical suggestion of GI bleeding.  Treat with GI cocktail and Zofran.  She has  "a headache we will give Fioricet    Laboratory data returned unremarkable.  Await serial troponins    Headache is persistent and ordered Norco.  Chest pain resolved with treatment.    Second troponin returned negative.  Low risk heart score.  Appropriate for outpatient management.    At this point I suspect most likely esophagitis.  I advise daily Prilosec.  Culdesac diet stay away from NSAIDs and aspirin.  She should follow-up with her doctor soon as possible.  She has already been referred to cardiology and I encouraged her to make an appointment.      Procedure Note:  Tobacco Cessation Counselling, Intermediate Timeframe, Greater than 3 minutes, less than 10 minutes:    Patient has been smoking for about 15 years, averaging 1 packs per week.  Has  tried to stop in the past.  The patient knows that smoking is bad for general health and for the patient's disease process in particular.  I have recommended the use of a \"quit alfred\" to facilitate success.  I have also talked about resources with the American Cancer Society, the American Heart Association, as well as 1-800-QUIT-NOW.  Finally, I briefly mentioned that pharmacologic adjuncts might be prescribed by a primary care physician, failing over-the-counter modalities.  The patient is given aftercare instructions on tobacco cessation.        ADDITIONAL PROBLEM LIST  Chronic pain-  Continue home narcotic and muscle relaxants    DISPOSITION AND DISCUSSIONS    Discussion of management with other [facility] or appropriate source(s): Pharmacy reviewed medications administered    Escalation of care considered, and ultimately not performed:acute inpatient care management, however at this time, the patient is most appropriate for outpatient management    Barriers to care at this time, including but not limited to: Patient does not have established PCP.     Decision tools and prescription drugs considered including, but not limited to: Low risk heart score.    FINAL DIAGNOSIS  Chest " pain-suspected esophagitis  Chronic headache  Tobacco abuse  Tobacco cessation counseling       Electronically signed by: Andrew Stephens M.D., 3/15/2023 5:27 AM       no

## 2024-10-29 NOTE — CONSULT NOTE ADULT - PROBLEM SELECTOR RECOMMENDATION 9
Pt with acute right knee pain which is somatic and neuropathic in nature due to primary OA. Patient is status post right total knee replacement with Dr. Salazar on 10/29 now POD 0. High risk medications reviewed. Avoid polypharmacy. Avoid IV opioids. Avoid benzodiazepines. Non-pharmacological sleep aides initiated. Non-opioid medications and non-pharmacological pain management measures initiated.   Opioid pain recommendations   - Continue Tramadol 50mg PO q 8 hours. Monitor for sedation/ respiratory depression.   - Continue Oxycodone 5 mg PO q 4 hours PRN moderate pain. Monitor for sedation/ respiratory depression.   Non-opioid pain recommendations   - Continue Toradol 15 mg IVP q 6 hours PRN severe pain  - Continue Acetaminophen 1 gram PO q 6 hours for 24 hours only. Monitor LFTs  - Continue Celebrex 200mg PO daily  Bowel Regimen  - Continue Miralax 17G PO daily  - Continue Senna 2 tablets at bedtime for constipation  Mild pain   - Non-pharmacological pain treatment recommendations  - Warm/ Cool packs PRN   - Repositioning extremity, elevation, imagery, relaxation, distraction.  - Physical therapy OOB if no contraindications   Recommendations discussed with primary team and RN.  Upon discharge – dc on Celebrex 200mg po daily and Percocet 5/325mg po q 6 hours prn for 1 week. Pt to take OTC stool softeners

## 2024-10-29 NOTE — PROVIDER CONTACT NOTE (OTHER) - ACTION/TREATMENT ORDERED:
Pt sitting up more in bed, more awake Aox3, drinking juice. MD Bynum at the bedside ordered 5mg Hydralazine to be given by CASPER Sheets in PACU

## 2024-10-29 NOTE — DISCHARGE NOTE PROVIDER - NSDCCPCAREPLAN_GEN_ALL_CORE_FT
PRINCIPAL DISCHARGE DIAGNOSIS  Diagnosis: Unilateral primary osteoarthritis, right knee  Assessment and Plan of Treatment: Pain Management- *See Attached Medication Reconciliation  *  **StretchStrap Stretching exercises for Total knee replacement***  Weight Bearing Status:  WBAT to RLE with walker  Equipment needs: Adrian, Walker  Dressing: Please keep bandage/dressing Clean, Dry, and Intact.  Dressing to be removed only if not in good condition or when staples are to be removed. If dressing is violated, change with dressing every 2- 3 days with 4x4, abd pads, and ACE bandage.   Dvt prophylaxis: D/C Xarelto 10 mg in 12 days then start Aspirin 325mg BID for 15 days.   Incision site: NURSING to remove staples on 11/13/24  PT/Occupational Therapy are Activities of Daily Living as appropriate  Follow up with Dr. Salazar in 2 WEEKS at [phone] after TRI ARE REMOVED

## 2024-10-29 NOTE — CONSULT NOTE ADULT - ASSESSMENT
Search Terms: Chace Jorge, 1945Search Date: 10/29/2024 16:52:47 PM  The Drug Utilization Report below displays all of the controlled substance prescriptions, if any, that your patient has filled in the last twelve months. The information displayed on this report is compiled from pharmacy submissions to the Department, and accurately reflects the information as submitted by the pharmacies.    This report was requested by: Paradise Simmons | Reference #: [id number]    Practitioner Count: 1  Pharmacy Count: 1  Current Opioid Prescriptions: 1  Current Benzodiazepine Prescriptions: 0  Current Stimulant Prescriptions: 0      Patient Demographic Information (PDI)       PDI	First Name	Last Name	Birth Date	Gender	Street Address	[facility]	Zip Code  A	Chace Carreonn	1945	Female	[address]	[id number]    Prescription Information      PDI Filter:    PDI	My Rx	Current Rx	Drug Type	Rx Written	Rx Dispensed	Drug	Quantity	Days Supply	Prescriber Name	Prescriber KEKE #	Payment Method	Dispenser  A	N	Y	O	10/22/2024	10/25/2024	oxycodone-acetaminophen 7.5-325 mg tablet	28	7	Emmanuel Salazar MD	[id number]	Insurance	[facility] A    * - Details of Drug Type : O = Opioid, B = Benzodiazepine, S = Stimulant

## 2024-10-29 NOTE — DISCHARGE NOTE PROVIDER - HOSPITAL COURSE
Pt is a 78 y/o F who underwent elective JOSE robot assisted right total knee replacement on 10/29/24. No complications. Pt received daily Physical therapy and Deep vein thrombosis prophylaxis postoperatively. Stable for discharge home/rehab. Pt is a 78 y/o F who underwent elective JOSE robot assisted right total knee replacement on 10/29/24. No complications. Pt received daily Physical therapy and Deep vein thrombosis prophylaxis postoperatively. Stable for discharge to rehab.

## 2024-10-29 NOTE — DISCHARGE NOTE PROVIDER - CARE PROVIDER_API CALL
Emmanuel Salazar  Orthopaedic Surgery  [address] [place] [address], [address] [place] [address]  Phone: [phone]  Fax: [phone]  Follow Up Time: 2 weeks

## 2024-10-29 NOTE — DISCHARGE NOTE PROVIDER - NSDCMRMEDTOKEN_GEN_ALL_CORE_FT
Actos 15 mg oral tablet: 1 tab(s) orally  gabapentin 300 mg oral tablet: orally  Lantus 100 units/mL subcutaneous solution: subcutaneous  oxyBUTYnin 5 mg oral tablet: 1 tab(s) orally   Actos 15 mg oral tablet: 1 tab(s) orally  aspirin 325 mg oral tablet: 1 tab(s) orally every 12 hours For 15 days. Please start ASPIRIN AFTER FINISHED WITH 12 DAYS OF XARELTO  CeleBREX 100 mg oral capsule: 1 cap(s) orally once a day as needed for  mild pain  ferrous sulfate 325 mg (65 mg elemental iron) oral delayed release tablet: 1 tab(s) orally once a day  gabapentin 100 mg oral capsule: 1 cap(s) orally every 8 hours  gabapentin 300 mg oral tablet: orally  Lantus 100 units/mL subcutaneous solution: subcutaneous  oxyBUTYnin 5 mg oral tablet: 1 tab(s) orally  pantoprazole 40 mg oral delayed release tablet: 1 tab(s) orally every 12 hours as needed for  heartburn  Percocet 10 mg-325 mg oral tablet: 1 tab(s) orally every 6 hours as needed for  severe pain  polyethylene glycol 3350 oral powder for reconstitution: 17 gram(s) orally once a day as needed for  constipation  senna (sennosides) 8.6 mg oral tablet: 1 tab(s) orally once a day (at bedtime) as needed for  constipation  Xarelto 10 mg oral tablet: 1 tab(s) orally once a day For 12 days. Please start ASPIRIN AFTER FINISHED WITH 12 DAYS OF XARELTO   Actos 15 mg oral tablet: 1 tab(s) orally  aspirin 325 mg oral tablet: 1 tab(s) orally every 12 hours For 15 days. Please start ASPIRIN AFTER FINISHED WITH 12 DAYS OF XARELTO  CeleBREX 100 mg oral capsule: 1 cap(s) orally once a day as needed for  mild pain  ferrous sulfate 325 mg (65 mg elemental iron) oral delayed release tablet: 1 tab(s) orally once a day  gabapentin 300 mg oral tablet: orally  Lantus 100 units/mL subcutaneous solution: subcutaneous  oxyBUTYnin 5 mg oral tablet: 1 tab(s) orally  pantoprazole 40 mg oral delayed release tablet: 1 tab(s) orally every 12 hours as needed for  heartburn  Percocet 10 mg-325 mg oral tablet: 1 tab(s) orally every 6 hours as needed for  severe pain  polyethylene glycol 3350 oral powder for reconstitution: 17 gram(s) orally once a day as needed for  constipation  senna (sennosides) 8.6 mg oral tablet: 1 tab(s) orally once a day (at bedtime) as needed for  constipation  Xarelto 10 mg oral tablet: 1 tab(s) orally once a day For 12 days. Please start ASPIRIN AFTER FINISHED WITH 12 DAYS OF XARELTO

## 2024-10-30 LAB
ANION GAP SERPL CALC-SCNC: 9 MMOL/L — SIGNIFICANT CHANGE UP (ref 5–17)
BUN SERPL-MCNC: 23 MG/DL — HIGH (ref 7–18)
CALCIUM SERPL-MCNC: 8.2 MG/DL — LOW (ref 8.4–10.5)
CHLORIDE SERPL-SCNC: 107 MMOL/L — SIGNIFICANT CHANGE UP (ref 96–108)
CO2 SERPL-SCNC: 23 MMOL/L — SIGNIFICANT CHANGE UP (ref 22–31)
CREAT SERPL-MCNC: 1 MG/DL — SIGNIFICANT CHANGE UP (ref 0.5–1.3)
EGFR: 58 ML/MIN/1.73M2 — LOW
GLUCOSE BLDC GLUCOMTR-MCNC: 142 MG/DL — HIGH (ref 70–99)
GLUCOSE BLDC GLUCOMTR-MCNC: 163 MG/DL — HIGH (ref 70–99)
GLUCOSE BLDC GLUCOMTR-MCNC: 214 MG/DL — HIGH (ref 70–99)
GLUCOSE BLDC GLUCOMTR-MCNC: 329 MG/DL — HIGH (ref 70–99)
GLUCOSE SERPL-MCNC: 160 MG/DL — HIGH (ref 70–99)
HCT VFR BLD CALC: 27.2 % — LOW (ref 34.5–45)
HGB BLD-MCNC: 9.1 G/DL — LOW (ref 11.5–15.5)
MCHC RBC-ENTMCNC: 29.4 PG — SIGNIFICANT CHANGE UP (ref 27–34)
MCHC RBC-ENTMCNC: 33.5 G/DL — SIGNIFICANT CHANGE UP (ref 32–36)
MCV RBC AUTO: 87.7 FL — SIGNIFICANT CHANGE UP (ref 80–100)
NRBC # BLD: 0 /100 WBCS — SIGNIFICANT CHANGE UP (ref 0–0)
PLATELET # BLD AUTO: 126 K/UL — LOW (ref 150–400)
POTASSIUM SERPL-MCNC: 4 MMOL/L — SIGNIFICANT CHANGE UP (ref 3.5–5.3)
POTASSIUM SERPL-SCNC: 4 MMOL/L — SIGNIFICANT CHANGE UP (ref 3.5–5.3)
RBC # BLD: 3.1 M/UL — LOW (ref 3.8–5.2)
RBC # FLD: 13.4 % — SIGNIFICANT CHANGE UP (ref 10.3–14.5)
SODIUM SERPL-SCNC: 139 MMOL/L — SIGNIFICANT CHANGE UP (ref 135–145)
WBC # BLD: 5.93 K/UL — SIGNIFICANT CHANGE UP (ref 3.8–10.5)
WBC # FLD AUTO: 5.93 K/UL — SIGNIFICANT CHANGE UP (ref 3.8–10.5)

## 2024-10-30 PROCEDURE — 99232 SBSQ HOSP IP/OBS MODERATE 35: CPT

## 2024-10-30 PROCEDURE — 99223 1ST HOSP IP/OBS HIGH 75: CPT

## 2024-10-30 RX ORDER — DIPHENHYDRAMINE HCL 12.5MG/5ML
50 ELIXIR ORAL ONCE
Refills: 0 | Status: COMPLETED | OUTPATIENT
Start: 2024-10-30 | End: 2024-10-30

## 2024-10-30 RX ORDER — MELATONIN 5 MG
1 TABLET ORAL AT BEDTIME
Refills: 0 | Status: DISCONTINUED | OUTPATIENT
Start: 2024-10-30 | End: 2024-11-01

## 2024-10-30 RX ADMIN — OXYCODONE HYDROCHLORIDE 5 MILLIGRAM(S): 30 TABLET ORAL at 11:56

## 2024-10-30 RX ADMIN — TRAMADOL HYDROCHLORIDE 50 MILLIGRAM(S): 50 TABLET, COATED ORAL at 13:30

## 2024-10-30 RX ADMIN — Medication 50 MILLIGRAM(S): at 22:42

## 2024-10-30 RX ADMIN — TRAMADOL HYDROCHLORIDE 50 MILLIGRAM(S): 50 TABLET, COATED ORAL at 14:30

## 2024-10-30 RX ADMIN — Medication 325 MILLIGRAM(S): at 11:58

## 2024-10-30 RX ADMIN — Medication 200 MILLIGRAM(S): at 12:58

## 2024-10-30 RX ADMIN — Medication 2: at 08:17

## 2024-10-30 RX ADMIN — Medication 200 MILLIGRAM(S): at 11:58

## 2024-10-30 RX ADMIN — FOLIC ACID 1 MILLIGRAM(S): 1 TABLET ORAL at 11:58

## 2024-10-30 RX ADMIN — FAMOTIDINE 20 MILLIGRAM(S): 10 INJECTION INTRAVENOUS at 05:37

## 2024-10-30 RX ADMIN — Medication 30 MILLIGRAM(S): at 11:58

## 2024-10-30 RX ADMIN — TRAMADOL HYDROCHLORIDE 50 MILLIGRAM(S): 50 TABLET, COATED ORAL at 05:37

## 2024-10-30 RX ADMIN — OXYCODONE HYDROCHLORIDE 5 MILLIGRAM(S): 30 TABLET ORAL at 10:56

## 2024-10-30 RX ADMIN — Medication 500 MILLIGRAM(S): at 05:37

## 2024-10-30 RX ADMIN — Medication 1000 MILLIGRAM(S): at 13:30

## 2024-10-30 RX ADMIN — Medication 1000 MILLIGRAM(S): at 14:30

## 2024-10-30 RX ADMIN — Medication 500 MILLIGRAM(S): at 17:20

## 2024-10-30 RX ADMIN — Medication 1000 MILLIGRAM(S): at 05:37

## 2024-10-30 RX ADMIN — Medication 1 TABLET(S): at 11:58

## 2024-10-30 RX ADMIN — Medication 100 MILLIGRAM(S): at 03:41

## 2024-10-30 RX ADMIN — Medication 8: at 17:01

## 2024-10-30 RX ADMIN — Medication 30 MILLIGRAM(S): at 22:23

## 2024-10-30 RX ADMIN — FAMOTIDINE 20 MILLIGRAM(S): 10 INJECTION INTRAVENOUS at 17:20

## 2024-10-30 RX ADMIN — TRAMADOL HYDROCHLORIDE 50 MILLIGRAM(S): 50 TABLET, COATED ORAL at 05:36

## 2024-10-30 NOTE — PHYSICAL THERAPY INITIAL EVALUATION ADULT - GAIT DEVIATIONS NOTED, PT EVAL
decreased leonela/increased time in double stance/decreased velocity of limb motion/decreased step length/decreased stride length/decreased swing-to-stance ratio/decreased weight-shifting ability

## 2024-10-30 NOTE — PHYSICAL THERAPY INITIAL EVALUATION ADULT - DIAGNOSIS, PT EVAL
Pt is a 80 y/o F s/p R knee replacement (JOSE robotic surgery), A&Ox3 but presents with confusion/delirium. Pt presents with decreased R LE A/PROM, muscle guarding, decreased R LE sensation, decreased LE strength, and decreased balance limiting her ability to perform bed mobility, transfers, ambulation or stair negotiation independently. This has decreased her functional mobility, functional capacity and  ability to perform ADL's.
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
## 2024-10-30 NOTE — PHYSICAL THERAPY INITIAL EVALUATION ADULT - NSACTIVITYREC_GEN_A_PT
Monitor mental status/ cognition and pain presentation. Pt should have A/PROM performed on her R LE as well as strengthening exercises, focusing on keeping her leg extended when resting.

## 2024-10-30 NOTE — PROGRESS NOTE ADULT - SUBJECTIVE AND OBJECTIVE BOX
Orthopedic Surgery     79yFemale    Diagnosis:  S/p Robot assisted RIGHT Total Knee Replacement POD#1    24hr interval:  Patient was seen and evaluated at bedside. Patient with no acute complaints.   Pain is moderate; localized to the RLE and well controlled medications. Patient states that the pain is improved with medication. Patient has been able to void independently and pending bowel movement.       Denies CP/SOB, dyspnea, paresthesias, N/V/D, palpitations.     Vital Signs Last 24 Hrs  T(C): 36.8 (30 Oct 2024 05:11), Max: 36.8 (30 Oct 2024 05:11)  T(F): 98.3 (30 Oct 2024 05:11), Max: 98.3 (30 Oct 2024 05:11)  HR: 66 (30 Oct 2024 05:11) (54 - 72)  BP: 121/60 (30 Oct 2024 05:11) (101/62 - 198/89)  BP(mean): 75 (29 Oct 2024 20:15) (75 - 130)  RR: 17 (30 Oct 2024 05:11) (10 - 18)  SpO2: 97% (30 Oct 2024 05:11) (96% - 100%)    Parameters below as of 30 Oct 2024 05:11  Patient On (Oxygen Delivery Method): room air      I&O's Detail    29 Oct 2024 07:01  -  30 Oct 2024 07:00  --------------------------------------------------------  IN:    Lactated Ringers Bolus: 800 mL    Oral Fluid: 100 mL  Total IN: 900 mL    OUT:    Blood Loss (mL): 100 mL    Voided (mL): 200 mL  Total OUT: 300 mL    Total NET: 600 mL          Physical Exam:    General: AAOx3, NAD, resting comfortably in bed.  MSK:  Right knee: Dressing is C/D/I. Wound edges are well approximated and staples are intact.   Skin is pink and warm.    No erythema.   No wound drainage.   Lower extremities:  No calf tenderness, calves are soft.   2+pulses. NVI. 5/5 Strength of EHL/TA/gastrocnemius B/L.    Good capillary refill. SILT.                          9.1    5.93  )-----------( 126      ( 30 Oct 2024 05:49 )             27.2     10-30    139  |  107  |  23[H]  ----------------------------<  160[H]  4.0   |  23  |  1.00    Ca    8.2[L]      30 Oct 2024 05:49        Impression:  79yFemale S/p Robot assisted Right Total Knee Replacement POD#1  Plan:  -  Dressing changed today  -  Pain management  -  Dvt prophylaxis with Lovenox  -  Daily Physical Therapy:  WBAT of the right lower extremity with appropriate assistive device  -  Discharge planning: Home pending   -  Continue with Post-op Antibiotics x 24hrs  -  Encouraged use of incentive spirometry  -  Case d/w Dr. Salazar

## 2024-10-30 NOTE — PHYSICAL THERAPY INITIAL EVALUATION ADULT - IMPAIRMENTS FOUND, PT EVAL
aerobic capacity/endurance/arousal, attention, and cognition/ergonomics and body mechanics/gait, locomotion, and balance/gross motor/muscle strength/poor safety awareness/posture/ROM/sensory integrity

## 2024-10-30 NOTE — PROGRESS NOTE ADULT - ASSESSMENT
Search Terms: Chace Jorge, 1945Search Date: 10/29/2024 16:52:47 PM  The Drug Utilization Report below displays all of the controlled substance prescriptions, if any, that your patient has filled in the last twelve months. The information displayed on this report is compiled from pharmacy submissions to the Department, and accurately reflects the information as submitted by the pharmacies.    This report was requested by: Paradise Simmons | Reference #: [id number]    Practitioner Count: 1  Pharmacy Count: 1  Current Opioid Prescriptions: 1  Current Benzodiazepine Prescriptions: 0  Current Stimulant Prescriptions: 0      Patient Demographic Information (PDI)       PDI	First Name	Last Name	Birth Date	Gender	Street Address	[facility]	Zip Code  A	Chace Carreonn	1945	Female	[address] [facility]	[address]    Prescription Information      PDI Filter:    PDI	My Rx	Current Rx	Drug Type	Rx Written	Rx Dispensed	Drug	Quantity	Days Supply	Prescriber Name	Prescriber KKEE #	Payment Method	Dispenser  A	N	Y	O	10/22/2024	10/25/2024	oxycodone-acetaminophen 7.5-325 mg tablet	28	7	Emmanuel Salazar MD	[id number]	Insurance	[facility] A    * - Details of Drug Type : O = Opioid, B = Benzodiazepine, S = Stimulant

## 2024-10-30 NOTE — PHYSICAL THERAPY INITIAL EVALUATION ADULT - RANGE OF MOTION EXAMINATION, REHAB EVAL
R LE decreased A/PROM/bilateral upper extremity ROM was WNL (within normal limits)/Left LE ROM was WNL (within normal limits)/trunk was WNL (within normal limits)/neck was WNL (within normal limits)

## 2024-10-30 NOTE — PROGRESS NOTE ADULT - PROBLEM SELECTOR PLAN 1
Pt with acute right knee pain which is somatic and neuropathic in nature due to primary OA. Patient is status post right total knee replacement with Dr. Salazar on 10/29 now POD 1. High risk medications reviewed. Avoid polypharmacy. Avoid IV opioids. Avoid benzodiazepines. Non-pharmacological sleep aides initiated. Non-opioid medications and non-pharmacological pain management measures initiated.   Opioid pain recommendations   - Continue Tramadol 50mg PO q 8 hours. Monitor for sedation/ respiratory depression.   - Continue Oxycodone 5 mg PO q 4 hours PRN moderate pain. Monitor for sedation/ respiratory depression.   Non-opioid pain recommendations   - Continue Toradol 15 mg IVP q 6 hours PRN severe pain  - Continue Acetaminophen 1 gram PO q 6 hours for 24 hours only. Monitor LFTs  - Continue Celebrex 200mg PO daily  Bowel Regimen  - Continue Miralax 17G PO daily  - Continue Senna 2 tablets at bedtime for constipation  Mild pain   - Non-pharmacological pain treatment recommendations  - Warm/ Cool packs PRN   - Repositioning extremity, elevation, imagery, relaxation, distraction.  - Physical therapy OOB if no contraindications   Recommendations discussed with primary team and RN.  Upon discharge – dc on Celebrex 200mg po daily and Percocet 5/325mg po q 6 hours prn for 1 week. Pt to take OTC stool softeners.

## 2024-10-30 NOTE — PROGRESS NOTE ADULT - SUBJECTIVE AND OBJECTIVE BOX
Source of information: TERENCE RUBY, Chart review  Patient language: English  : n/a    HPI:  Patient is a 79 year old female with a PMHx significant for DM and primary OA of the right knee. Patient is admitted for scheduled Right Total Knee Replacement with Dr. Salazar now POD 1. Pain service consulted for acute postoperative pain. Pt seen and examined at bedside, this morning. At time of assessment, patient laying in bed reports pain score 6/10 and also reports that her sister  in a car accident SCALE USED: (1-10 VNRS). Daughter reports that her mom is hallucinating and was mildly combative overnight, safety precautions maintained.  Pt describes pain as localized to right knee joint constant and throbbing in quality. The pain is currently alleviated by pain medication and is exacerbated by movement. Pt tolerating PO diet. Denies lethargy, chest pain, SOB, nausea, vomiting, constipation. Reports last BM 2 days ago. Patient stated goal for pain control: to be able to take deep breaths, get out of bed to chair and ambulate with tolerable pain control. Pt denies taking medications for pain at home.     PAST MEDICAL & SURGICAL HISTORY:  DM (diabetes mellitus)    OA (osteoarthritis)    H/O total hysterectomy    History of cholecystectomy  FAMILY HISTORY:    Social History:  [x] Denies ETOH use, illicit drug use and smoking    Allergies    No Known Allergies    Intolerances    MEDICATIONS  (STANDING):  acetaminophen     Tablet .. 1000 milliGRAM(s) Oral every 8 hours  ascorbic acid 500 milliGRAM(s) Oral two times a day  celecoxib 200 milliGRAM(s) Oral daily  dextrose 5%. 1000 milliLiter(s) (50 mL/Hr) IV Continuous <Continuous>  dextrose 5%. 1000 milliLiter(s) (100 mL/Hr) IV Continuous <Continuous>  dextrose 50% Injectable 25 Gram(s) IV Push once  dextrose 50% Injectable 25 Gram(s) IV Push once  dextrose 50% Injectable 12.5 Gram(s) IV Push once  enoxaparin Injectable 30 milliGRAM(s) SubCutaneous every 12 hours  famotidine    Tablet 20 milliGRAM(s) Oral every 12 hours  ferrous    sulfate 325 milliGRAM(s) Oral daily  folic acid 1 milliGRAM(s) Oral daily  glucagon  Injectable 1 milliGRAM(s) IntraMuscular once  insulin lispro (ADMELOG) corrective regimen sliding scale   SubCutaneous three times a day before meals  multivitamin 1 Tablet(s) Oral daily  polyethylene glycol 3350 17 Gram(s) Oral at bedtime  senna 2 Tablet(s) Oral at bedtime  sodium chloride 0.9%. 1000 milliLiter(s) (110 mL/Hr) IV Continuous <Continuous>  traMADol 50 milliGRAM(s) Oral every 8 hours    MEDICATIONS  (PRN):  dextrose Oral Gel 15 Gram(s) Oral once PRN Blood Glucose LESS THAN 70 milliGRAM(s)/deciliter  ketorolac   Injectable 15 milliGRAM(s) IV Push every 6 hours PRN Severe Pain (7 - 10)  magnesium hydroxide Suspension 30 milliLiter(s) Oral daily PRN Constipation  ondansetron Injectable 4 milliGRAM(s) IV Push every 6 hours PRN Nausea and/or Vomiting  oxyCODONE    IR 5 milliGRAM(s) Oral every 4 hours PRN Moderate Pain (4 - 6)    Vital Signs Last 24 Hrs  T(C): 36.8 (30 Oct 2024 05:11), Max: 36.8 (30 Oct 2024 05:11)  T(F): 98.3 (30 Oct 2024 05:11), Max: 98.3 (30 Oct 2024 05:11)  HR: 66 (30 Oct 2024 05:11) (54 - 72)  BP: 121/60 (30 Oct 2024 05:11) (101/62 - 198/89)  BP(mean): 75 (29 Oct 2024 20:15) (75 - 130)  RR: 17 (30 Oct 2024 05:11) (10 - 18)  SpO2: 97% (30 Oct 2024 05:11) (96% - 100%)    Parameters below as of 30 Oct 2024 05:11  Patient On (Oxygen Delivery Method): room air    LABS: Reviewed                          9.1    5.93  )-----------( 126      ( 30 Oct 2024 05:49 )             27.2     10-30    139  |  107  |  23[H]  ----------------------------<  160[H]  4.0   |  23  |  1.00    Ca    8.2[L]      30 Oct 2024 05:49    Urinalysis Basic - ( 30 Oct 2024 05:49 )    Color: x / Appearance: x / SG: x / pH: x  Gluc: 160 mg/dL / Ketone: x  / Bili: x / Urobili: x   Blood: x / Protein: x / Nitrite: x   Leuk Esterase: x / RBC: x / WBC x   Sq Epi: x / Non Sq Epi: x / Bacteria: x    CAPILLARY BLOOD GLUCOSE    POCT Blood Glucose.: 163 mg/dL (30 Oct 2024 07:38)  POCT Blood Glucose.: 243 mg/dL (29 Oct 2024 21:51)  POCT Blood Glucose.: 142 mg/dL (29 Oct 2024 16:46)  POCT Blood Glucose.: 113 mg/dL (29 Oct 2024 13:41)    Radiology: None to be Reviewed.     ORT Score -   Family Hx of substance abuse	Female	      Male  Alcohol 	                                           1                     3  Illegal drugs	                                   2                     3  Rx drugs                                           4 	                  4  Personal Hx of substance abuse		  Alcohol 	                                          3	                  3  Illegal drugs                                     4	                  4  Rx drugs                                            5 	                  5  Age between 16- 45 years	           1                     1  hx preadolescent sexual abuse	   3 	                  0  Psychological disease		  ADD, OCD, bipolar, schizophrenia   2	          2  Depression                                           1 	          1  Total: 0    a score of 3 or lower indicates low risk for opioid abuse		  a score of 4-7 indicates moderate risk for opioid abuse		  a score of 8 or higher indicates high risk for opioid abuse  	  REVIEW OF SYSTEMS:  CONSTITUTIONAL: No fever or fatigue  HEENT:  No difficulty hearing, no change in vision  NECK: No pain or stiffness  RESPIRATORY: No cough, wheezing, chills or hemoptysis; No shortness of breath  CARDIOVASCULAR: No chest pain, palpitations, dizziness, or leg swelling  GASTROINTESTINAL: No loss of appetite, decreased PO intake. No abdominal or epigastric pain. No nausea, vomiting; No diarrhea or constipation.   GENITOURINARY: No dysuria, frequency, hematuria, retention or incontinence  MUSCULOSKELETAL: + right knee pain; No back pain, no upper or lower motor strength weakness, no saddle anesthesia, bowel/bladder incontinence, no falls   NEURO: No headaches, No numbness/tingling b/l LE, No weakness  ENDOCRINE: No polyuria, polydipsia, heat or cold intolerance; No hair loss  PSYCHIATRIC: No depression, anxiety or difficulty sleeping    PHYSICAL EXAM:  GENERAL:  Alert & Oriented x 3 with periods of confusion,  NAD  RESPIRATORY: Respirations even and unlabored. Clear to auscultation bilaterally  CARDIOVASCULAR: Normal S1/S2, regular rate and rhythm  GASTROINTESTINAL:  Soft, Nontender, Nondistended; Bowel sounds present  PERIPHERAL VASCULAR:  Extremities warm. 2+ Peripheral Pulses, No cyanosis, No calf tenderness  MUSCULOSKELETAL: Motor Strength 5/5 B/L upper and lower extremities; moves all extremities equally against gravity; decreased RLE ROM due to pain; + right knee tenderness on palpation   SKIN: Warm, dry, + right knee ace C/D/I     Risk factors associated with adverse outcomes related to opioid treatment  [ ] Concurrent benzodiazepine use  [ ] History/ Active substance use or alcohol use disorder  [ ] Psychiatric co-morbidity  [ ] Sleep apnea  [ ] COPD  [ ] BMI> 35  [ ] Liver dysfunction  [ ] Renal dysfunction  [ ] CHF  [ ] Smoker  [x] Age > 60 years    [x]  [place]  Reviewed and Copied to Chart. See below.    Plan of care and goal oriented pain management treatment options were discussed with patient and /or primary care giver; all questions and concerns were addressed and care was aligned with patient's wishes.    Educated patient on goal oriented pain management treatment options

## 2024-10-30 NOTE — CONSULT NOTE ADULT - ASSESSMENT
Pt is a 79F PMH of who underwent elective JOSE robot assisted right total knee replacement on 10/29/24. No complications. Pt received daily Physical therapy and Deep vein thrombosis prophylaxis postoperatively. Stable for discharge home/rehab.  79F PMH of DM, depression on Paxil,who underwent elective JOSE robot assisted right total knee replacement on 10/29/24 medicine was consulted for acute encephalopathy in the setting of likely delirium    Assessment:  Acute Encephalopathy/Delirium:  The patient is experiencing acute confusion and altered mental status following surgery, which is characteristic of delirium. This is common in the postoperative period, especially in elderly patients.    Potential contributing factors include:  Postoperative pain and medications (e.g., opioids)  Anesthesia effects  Electrolyte imbalances  Sleep disturbances  Infection (e.g., urinary tract infection, pneumonia)      Plan:  Delirium Management:  #Non-Pharmacological Interventions:  Ensure a calm and quiet environment.  Reorient the patient frequently, using clocks and calendars.  Encourage family presence for reassurance.  Ensure adequate lighting during the day to maintain normal sleep-wake cycles.    #Pharmacological Interventions:  Review current medications and minimize or discontinue those that may contribute to delirium, such as opioids and sedatives, if possible.  [ ] Consider low-dose antipsychotics (e.g., haloperidol) if the patient is severely agitated or poses a risk to themselves or others, after evaluating cardiovascular risks.  [ ] Psych consult in AM, if stable DC    #Pain Management:  Optimize non-opioid analgesics such as acetaminophen if not contraindicated.  Consider regional anesthesia techniques if appropriate.  Appreciate pain mgt recs    #Infection Prevention and Management:  Monitor for signs of infection, including fever, elevated white blood cell count, and site-specific symptoms.  Obtain cultures if infection is suspected and initiate empiric antibiotics as necessary.    #Electrolyte and Metabolic Monitoring:  -Review and correct any electrolyte imbalances (e.g., sodium, calcium).  Ensure adequate hydration and nutrition.     79F PMH of DM, depression on Paxil,who underwent elective JOSE robot assisted right total knee replacement on 10/29/24 medicine was consulted for acute encephalopathy in the setting of likely delirium    Assessment:  Acute Encephalopathy/Delirium:  The patient is experiencing acute confusion and altered mental status following surgery, which is characteristic of delirium. This is common in the postoperative period, especially in elderly patients.    Potential contributing factors include:  Postoperative pain and medications (e.g., opioids)  Anesthesia effects  Electrolyte imbalances  Sleep disturbances  Infection (e.g., urinary tract infection, pneumonia)    Plan:  Delirium Management:  #Non-Pharmacological Interventions:  Ensure a calm and quiet environment.  Reorient the patient frequently, using clocks and calendars.  Encourage family presence for reassurance.  Ensure adequate lighting during the day to maintain normal sleep-wake cycles.    #Pharmacological Interventions:  Review current medications and minimize or discontinue those that may contribute to delirium, such as opioids and sedatives, if possible.  [ ] Consider low-dose antipsychotics (e.g., haloperidol) if the patient is severely agitated or poses a risk to themselves or others, after evaluating cardiovascular risks.  [ ] Psych consult in AM, if stable DC    #Pain Management:  Optimize non-opioid analgesics such as acetaminophen if not contraindicated.  Consider regional anesthesia techniques if appropriate.  Appreciate pain mgt recs    #Infection Prevention and Management:  Monitor for signs of infection, including fever, elevated white blood cell count, and site-specific symptoms.  Obtain cultures if infection is suspected and initiate empiric antibiotics as necessary.    #Electrolyte and Metabolic Monitoring:  -Review and correct any electrolyte imbalances (e.g., sodium, calcium).  Ensure adequate hydration and nutrition.     79F PMH of DM, depression on Paxil,who underwent elective JOSE robot assisted right total knee replacement on 10/29/24 medicine was consulted for acute encephalopathy in the setting of likely delirium    Assessment:  Acute Encephalopathy/Delirium:  The patient is experiencing acute confusion and altered mental status following surgery, which is characteristic of delirium. This is common in the postoperative period, especially in elderly patients.    Potential contributing factors include: Postoperative pain and medications (e.g., opioids) Anesthesia effects, Electrolyte imbalances Sleep disturbances Infection (e.g., urinary tract infection, pneumonia) Family hx notable for similar reactions after surgery in patient's mother    Plan:  Delirium Management:  #Non-Pharmacological Interventions:  Ensure a calm and quiet environment.  Reorient the patient frequently, using clocks and calendars.  Encourage family presence for reassurance.  Ensure adequate lighting during the day to maintain normal sleep-wake cycles.    #Pharmacological Interventions:  Review current medications and minimize or discontinue those that may contribute to delirium, such as opioids and sedatives, if possible.  [ ] Consider low-dose antipsychotics (e.g., haloperidol) if the patient is severely agitated or poses a risk to themselves or others, after evaluating cardiovascular risks.  [ ] Obtain ECG to rule out QTc prolongation  [ ] Psych consult in AM, if stable DC    #Pain Management:  Optimize non-opioid analgesics such as acetaminophen if not contraindicated.  Consider regional anesthesia techniques if appropriate.  Appreciate pain mgt recs    #Infection Prevention and Management:  Patient currently has no signs of infection, including fever, elevated white blood cell count, and site-specific symptoms.  Obtain cultures if infection is suspected and initiate empiric antibiotics as necessary.    #Electrolyte and Metabolic Monitoring:  BMP unremarkable  Ensure adequate hydration and nutrition.     79F PMH of DM, depression on Paxil,who underwent elective JOSE robot assisted right total knee replacement on 10/29/24 medicine was consulted for acute encephalopathy in the setting of likely delirium    Assessment:  Acute Encephalopathy/Delirium:  The patient is experiencing acute confusion and altered mental status following surgery, which is characteristic of delirium. This is common in the postoperative period, especially in elderly patients.    Potential contributing factors include: Postoperative pain and medications (e.g., opioids) Anesthesia effects, Electrolyte imbalances Sleep disturbances Infection (e.g., urinary tract infection, pneumonia) Family hx notable for similar reactions after surgery in patient's mother    Plan:  Delirium Management:  #Non-Pharmacological Interventions:  Ensure a calm and quiet environment.  Reorient the patient frequently, using clocks and calendars.  Encourage family presence for reassurance.  Ensure adequate lighting during the day to maintain normal sleep-wake cycles.  [ ] Rule out infectious etiology with UA    #Pharmacological Interventions:  Review current medications and minimize or discontinue those that may contribute to delirium, such as opioids and sedatives, if possible.  [ ] Consider low-dose antipsychotics (e.g., haloperidol) if the patient is severely agitated or poses a risk to themselves or others, after evaluating cardiovascular risks.  [ ] Obtain ECG to rule out QTc prolongation  [ ] Psych consult in AM, if stable DC    #Pain Management:  Optimize non-opioid analgesics such as acetaminophen if not contraindicated.  Consider regional anesthesia techniques if appropriate.  Appreciate pain mgt recs    #Infection Prevention and Management:  Patient currently has no signs of infection, including fever, elevated white blood cell count, and site-specific symptoms.  Obtain cultures if infection is suspected and initiate empiric antibiotics as necessary.    #Electrolyte and Metabolic Monitoring:  BMP unremarkable  Ensure adequate hydration and nutrition.     79F PMH of DM, depression on Paxil,who underwent elective JOSE robot assisted right total knee replacement on 10/29/24 medicine was consulted for acute encephalopathy in the setting of likely delirium    Assessment:  Acute Encephalopathy/Delirium:  The patient is experiencing acute confusion and altered mental status following surgery, which is characteristic of delirium. This is common in the postoperative period, especially in elderly patients.    Potential contributing factors include: Postoperative pain and medications (e.g., opioids) Anesthesia effects, Electrolyte imbalances Sleep disturbances Infection (e.g., urinary tract infection, pneumonia) Family hx notable for similar reactions after surgery in patient's mother    Plan:  Delirium Management:  #Non-Pharmacological Interventions:  Ensure a calm and quiet environment.  Reorient the patient frequently, using clocks and calendars.  Encourage family presence for reassurance.  Ensure adequate lighting during the day to maintain normal sleep-wake cycles.  [ ] Rule out infectious etiology with UA    #Pharmacological Interventions:  Review current medications and minimize or discontinue those that may contribute to delirium, such as opioids and sedatives, if possible.  [ ] Consider low-dose antipsychotics (e.g., haloperidol) if the patient is severely agitated or poses a risk to themselves or others, after evaluating cardiovascular risks.  [ ] Obtain ECG to rule out QTc prolongation  [ ]  CT Head if patient does not improve or return to baseline  [ ] Psych consult in AM    #Pain Management:  Optimize non-opioid analgesics such as acetaminophen if not contraindicated.  Consider regional anesthesia techniques if appropriate.  Appreciate pain mgt recs    #Infection Prevention and Management:  Patient currently has no signs of infection, including fever, elevated white blood cell count, and site-specific symptoms.  Obtain cultures if infection is suspected and initiate empiric antibiotics as necessary.    #Electrolyte and Metabolic Monitoring:  BMP unremarkable  Ensure adequate hydration and nutrition.

## 2024-10-30 NOTE — PHYSICAL THERAPY INITIAL EVALUATION ADULT - PERTINENT HX OF CURRENT PROBLEM, REHAB EVAL
Pt is a 80 y/o F s/p R knee replacement (JOSE robotic surgery), expressing complaints of immense pain in her R knee, A&Ox3 but confused, not being able to focus. Pt was ambulating independently utilizing a rollator AD prior, but was having pain in her knee making it hard to get around. Pt lived with her daughter in a private home having to ambulate a lot of stair.

## 2024-10-30 NOTE — PHYSICAL THERAPY INITIAL EVALUATION ADULT - GENERAL OBSERVATIONS, REHAB EVAL
Pt. received lying in bed, A&Ox3 but confused/delirium, bi knees bent and expressing immense pain in bed, discoloration on R ankle around sock line.

## 2024-10-30 NOTE — PHARMACOTHERAPY INTERVENTION NOTE - COMMENTS
Patient identified by [facility] for Patients 66 y/o and Older Report. No intervention at this time.  (S/p Robot assisted RIGHT Total Knee Replacement POD#1.  Pt is followed by Adult Pain NP).

## 2024-10-30 NOTE — PHYSICAL THERAPY INITIAL EVALUATION ADULT - IMPAIRMENTS CONTRIBUTING TO GAIT DEVIATIONS, PT EVAL
impaired balance/cognition/narrow base of support/pain/decreased ROM/decreased sensation/decreased strength

## 2024-10-30 NOTE — CONSULT NOTE ADULT - SUBJECTIVE AND OBJECTIVE BOX
TERENCE RUBY  79y  Female      Patient is a 79y old  Female who presents with a chief complaint of Right Total Knee Replacement (30 Oct 2024 11:04)        PAST MEDICAL/SURGICAL HISTORY  PAST MEDICAL & SURGICAL HISTORY:  DM (diabetes mellitus)      OA (osteoarthritis)      H/O total hysterectomy      History of cholecystectomy          REVIEW OF SYSTEMS:  CONSTITUTIONAL: No fever, weight loss, or fatigue  EYES: No eye pain, visual disturbances, or discharge  ENMT:  No difficulty hearing, tinnitus, vertigo; No sinus or throat pain  NECK: No pain or stiffness  BREASTS: No pain, masses, or nipple discharge  RESPIRATORY: No cough, wheezing, chills or hemoptysis; No shortness of breath  CARDIOVASCULAR: No chest pain, palpitations, dizziness, or leg swelling  GASTROINTESTINAL: No abdominal or epigastric pain. No nausea, vomiting, or hematemesis; No diarrhea or constipation. No melena or hematochezia.  GENITOURINARY: No dysuria, frequency, hematuria, or incontinence  NEUROLOGICAL: No headaches, memory loss, loss of strength, numbness, or tremors  SKIN: No itching, burning, rashes, or lesions   LYMPH NODES: No enlarged glands  ENDOCRINE: No heat or cold intolerance; No hair loss  MUSCULOSKELETAL: No joint pain or swelling; No muscle, back, or extremity pain  PSYCHIATRIC: No depression, anxiety, mood swings, or difficulty sleeping  HEME/LYMPH: No easy bruising, or bleeding gums  ALLERY AND IMMUNOLOGIC: No hives or eczema    T(C): 36.7 (10-30-24 @ 20:53), Max: 36.8 (10-30-24 @ 05:11)  HR: 96 (10-30-24 @ 20:53) (65 - 96)  BP: 165/76 (10-30-24 @ 20:53) (121/60 - 165/76)  RR: 18 (10-30-24 @ 20:53) (17 - 18)  SpO2: 96% (10-30-24 @ 20:53) (96% - 99%)  Wt(kg): --Vital Signs Last 24 Hrs  T(C): 36.7 (30 Oct 2024 20:53), Max: 36.8 (30 Oct 2024 05:11)  T(F): 98.1 (30 Oct 2024 20:53), Max: 98.3 (30 Oct 2024 05:11)  HR: 96 (30 Oct 2024 20:53) (65 - 96)  BP: 165/76 (30 Oct 2024 20:53) (121/60 - 165/76)  BP(mean): 106 (30 Oct 2024 20:53) (84 - 106)  RR: 18 (30 Oct 2024 20:53) (17 - 18)  SpO2: 96% (30 Oct 2024 20:53) (96% - 99%)    Parameters below as of 30 Oct 2024 20:53  Patient On (Oxygen Delivery Method): room air        PHYSICAL EXAM:  GENERAL: NAD, well-groomed, well-developed  HEAD:  Atraumatic, Normocephalic  EYES: EOMI, PERRLA, conjunctiva and sclera clear  ENMT: No tonsillar erythema, exudates, or enlargement; Moist mucous membranes, Good dentition, No lesions  NECK: Supple, No JVD, Normal thyroid  NERVOUS SYSTEM:  Alert & Oriented X3, Good concentration; Motor Strength 5/5 B/L upper and lower extremities; DTRs 2+ intact and symmetric  CHEST/LUNG: Clear to percussion bilaterally; No rales, rhonchi, wheezing, or rubs  HEART: Regular rate and rhythm; No murmurs, rubs, or gallops  ABDOMEN: Soft, Nontender, Nondistended; Bowel sounds present  EXTREMITIES:  2+ Peripheral Pulses, No clubbing, cyanosis, or edema  LYMPH: No lymphadenopathy noted  SKIN: No rashes or lesions    Consultant(s) Notes Reviewed:  [x ] YES  [ ] NO  Care Discussed with Consultants/Other Providers [ x] YES  [ ] NO    LABS:  CBC   10-30-24 @ 05:49  Hematcorit 27.2  Hemoglobin 9.1  Mean Cell Hemoglobin 29.4  Platelet Count-Automated 126  RBC Count 3.10  Red Cell Distrib Width 13.4  Wbc Count 5.93      BMP  10-30-24 @ 05:49  Anion Gap. Serum 9  Blood Urea Nitrogen,Serm 23  Calcium, Total Serum 8.2  Carbon Dioxide, Serum 23  Chloride, Serum 107  Creatinine, Serum 1.00  eGFR in  --  eGFR in Non Afican American --  Gloucose, serum 160  Potassium, Serum 4.0  Sodium, Serum 139              10-29-24 @ 13:40  Anion Gap. Serum 5  Blood Urea Nitrogen,Serm 24  Calcium, Total Serum 8.5  Carbon Dioxide, Serum 25  Chloride, Serum 111  Creatinine, Serum 0.94  eGFR in  --  eGFR in Non Afican American --  Gloucose, serum 118  Potassium, Serum 3.8  Sodium, Serum 141                  CMP  10-30-24 @ 05:49  Benita Aminotransferase(ALT/SGPT)--  Albumin, Serum --  Alkaline Phosphatase, Serum --  Anion Gap, Serum 9  Aspartate Aminotransferase (AST/SGOT)--  Bilirubin Total, Serum --  Blood Urea Nitrogen, Serum 23  Calcium,Total Serum 8.2  Carbon Dioxide, Serum 23  Chloride, Serum 107  Creatinine, Serum 1.00  eGFR if  --  eGFR if Non African American --  Glucose, Serum 160  Potassium, Serum 4.0  Protein Total, Serum --  Sodium, Serum 139                      10-29-24 @ 13:40  Benita Aminotransferase(ALT/SGPT)--  Albumin, Serum --  Alkaline Phosphatase, Serum --  Anion Gap, Serum 5  Aspartate Aminotransferase (AST/SGOT)--  Bilirubin Total, Serum --  Blood Urea Nitrogen, Serum 24  Calcium,Total Serum 8.5  Carbon Dioxide, Serum 25  Chloride, Serum 111  Creatinine, Serum 0.94  eGFR if  --  eGFR if Non African American --  Glucose, Serum 118  Potassium, Serum 3.8  Protein Total, Serum --  Sodium, Serum 141                          PT/INR      Amylase/Lipase            RADIOLOGY & ADDITIONAL TESTS:    Imaging Personally Reviewed:  [ ] YES  [ ] NO TERENCE RUBY  79y  Female      Patient is a 79y old  Female who presents with a chief complaint of Right Total Knee Replacement (30 Oct 2024 11:04)        PAST MEDICAL/SURGICAL HISTORY  PAST MEDICAL & SURGICAL HISTORY:  DM (diabetes mellitus)      OA (osteoarthritis)      H/O total hysterectomy      History of cholecystectomy        CONSTITUTIONAL: No fever  RESPIRATORY:  No shortness of breath  CARDIOVASCULAR: No chest pain  GASTROINTESTINAL: No abdominal pain.  GENITOURINARY: No dysuria   NEUROLOGICAL: No headaches,  ENDOCRINE: No polyuria  musculoskeletal No muscle aches  HEME: no easy bruisability  SKIN: No itching, burning, rashes, or lesions .     T(C): 36.7 (10-30-24 @ 20:53), Max: 36.8 (10-30-24 @ 05:11)  HR: 96 (10-30-24 @ 20:53) (65 - 96)  BP: 165/76 (10-30-24 @ 20:53) (121/60 - 165/76)  RR: 18 (10-30-24 @ 20:53) (17 - 18)  SpO2: 96% (10-30-24 @ 20:53) (96% - 99%)  Wt(kg): --Vital Signs Last 24 Hrs  T(C): 36.7 (30 Oct 2024 20:53), Max: 36.8 (30 Oct 2024 05:11)  T(F): 98.1 (30 Oct 2024 20:53), Max: 98.3 (30 Oct 2024 05:11)  HR: 96 (30 Oct 2024 20:53) (65 - 96)  BP: 165/76 (30 Oct 2024 20:53) (121/60 - 165/76)  BP(mean): 106 (30 Oct 2024 20:53) (84 - 106)  RR: 18 (30 Oct 2024 20:53) (17 - 18)  SpO2: 96% (30 Oct 2024 20:53) (96% - 99%)    Parameters below as of 30 Oct 2024 20:53  Patient On (Oxygen Delivery Method): room air        PHYSICAL EXAM:  GENERAL: NAD, elderly  HEAD:  Atraumatic, Normocephalic  EYES: EOMI, PERRLA, conjunctiva and sclera clear  NERVOUS SYSTEM:  AOX1 to self, fixated on "those people are trying to get us"  CHEST/LUNG: Lungs clear to auscultation bilaterally, No rales, rhonchi, wheezing   HEART: Regular rate and rhythm; No murmurs, rubs, or gallops  ABDOMEN: Soft, Nontender, Nondistended; Bowel sounds present  EXTREMITIES: No clubbing, cyanosis, or edema  SKIN: No rashes or lesions;  Good capillary refill     Consultant(s) Notes Reviewed:  [x ] YES  [ ] NO  Care Discussed with Consultants/Other Providers [ x] YES  [ ] NO    LABS:  CBC   10-30-24 @ 05:49  Hematcorit 27.2  Hemoglobin 9.1  Mean Cell Hemoglobin 29.4  Platelet Count-Automated 126  RBC Count 3.10  Red Cell Distrib Width 13.4  Wbc Count 5.93      BMP  10-30-24 @ 05:49  Anion Gap. Serum 9  Blood Urea Nitrogen,Serm 23  Calcium, Total Serum 8.2  Carbon Dioxide, Serum 23  Chloride, Serum 107  Creatinine, Serum 1.00  eGFR in  --  eGFR in Non Afican American --  Gloucose, serum 160  Potassium, Serum 4.0  Sodium, Serum 139              10-29-24 @ 13:40  Anion Gap. Serum 5  Blood Urea Nitrogen,Serm 24  Calcium, Total Serum 8.5  Carbon Dioxide, Serum 25  Chloride, Serum 111  Creatinine, Serum 0.94  eGFR in  --  eGFR in Non Afican American --  Gloucose, serum 118  Potassium, Serum 3.8  Sodium, Serum 141                  CMP  10-30-24 @ 05:49  Benita Aminotransferase(ALT/SGPT)--  Albumin, Serum --  Alkaline Phosphatase, Serum --  Anion Gap, Serum 9  Aspartate Aminotransferase (AST/SGOT)--  Bilirubin Total, Serum --  Blood Urea Nitrogen, Serum 23  Calcium,Total Serum 8.2  Carbon Dioxide, Serum 23  Chloride, Serum 107  Creatinine, Serum 1.00  eGFR if  --  eGFR if Non African American --  Glucose, Serum 160  Potassium, Serum 4.0  Protein Total, Serum --  Sodium, Serum 139                      10-29-24 @ 13:40  Benita Aminotransferase(ALT/SGPT)--  Albumin, Serum --  Alkaline Phosphatase, Serum --  Anion Gap, Serum 5  Aspartate Aminotransferase (AST/SGOT)--  Bilirubin Total, Serum --  Blood Urea Nitrogen, Serum 24  Calcium,Total Serum 8.5  Carbon Dioxide, Serum 25  Chloride, Serum 111  Creatinine, Serum 0.94  eGFR if  --  eGFR if Non African American --  Glucose, Serum 118  Potassium, Serum 3.8  Protein Total, Serum --  Sodium, Serum 141                          PT/INR      Amylase/Lipase            RADIOLOGY & ADDITIONAL TESTS:    Imaging Personally Reviewed:  [ ] YES  [ ] NO TERENCE JORGE  79y  Female      Patient is a 79y old  Female who presents with a chief complaint of Right Total Knee Replacement (30 Oct 2024 11:04)  Medicine was consulted for AMS; obtained collateral from sister Dr. Mickie Jorge; [phone]; patient has had similar reaction after surgery for her cholecystectomy a few years ago. Her mother also has similar reactions and is easy to sundown in a hosptial enviornment.      PAST MEDICAL/SURGICAL HISTORY  PAST MEDICAL & SURGICAL HISTORY:  DM (diabetes mellitus)      OA (osteoarthritis)      H/O total hysterectomy      History of cholecystectomy        CONSTITUTIONAL: No fever  RESPIRATORY:  No shortness of breath  CARDIOVASCULAR: No chest pain  GASTROINTESTINAL: No abdominal pain.  GENITOURINARY: No dysuria   NEUROLOGICAL: No headaches,  ENDOCRINE: No polyuria  musculoskeletal No muscle aches  HEME: no easy bruisability  SKIN: No itching, burning, rashes, or lesions .     T(C): 36.7 (10-30-24 @ 20:53), Max: 36.8 (10-30-24 @ 05:11)  HR: 96 (10-30-24 @ 20:53) (65 - 96)  BP: 165/76 (10-30-24 @ 20:53) (121/60 - 165/76)  RR: 18 (10-30-24 @ 20:53) (17 - 18)  SpO2: 96% (10-30-24 @ 20:53) (96% - 99%)  Wt(kg): --Vital Signs Last 24 Hrs  T(C): 36.7 (30 Oct 2024 20:53), Max: 36.8 (30 Oct 2024 05:11)  T(F): 98.1 (30 Oct 2024 20:53), Max: 98.3 (30 Oct 2024 05:11)  HR: 96 (30 Oct 2024 20:53) (65 - 96)  BP: 165/76 (30 Oct 2024 20:53) (121/60 - 165/76)  BP(mean): 106 (30 Oct 2024 20:53) (84 - 106)  RR: 18 (30 Oct 2024 20:53) (17 - 18)  SpO2: 96% (30 Oct 2024 20:53) (96% - 99%)    Parameters below as of 30 Oct 2024 20:53  Patient On (Oxygen Delivery Method): room air        PHYSICAL EXAM:  GENERAL: NAD, elderly  HEAD:  Atraumatic, Normocephalic  EYES: EOMI, PERRLA, conjunctiva and sclera clear  NERVOUS SYSTEM:  AOX1 to self, fixated on "those people are trying to get us". No focal deficits  CHEST/LUNG: Lungs clear to auscultation bilaterally, No rales, rhonchi, wheezing   HEART: Regular rate and rhythm; No murmurs, rubs, or gallops  ABDOMEN: Soft, Nontender, Nondistended; Bowel sounds present  EXTREMITIES: No clubbing, cyanosis, or edema  SKIN: No rashes or lesions;  Good capillary refill     Consultant(s) Notes Reviewed:  [x ] YES  [ ] NO  Care Discussed with Consultants/Other Providers [ x] YES  [ ] NO    LABS:  CBC   10-30-24 @ 05:49  Hematcorit 27.2  Hemoglobin 9.1  Mean Cell Hemoglobin 29.4  Platelet Count-Automated 126  RBC Count 3.10  Red Cell Distrib Width 13.4  Wbc Count 5.93      BMP  10-30-24 @ 05:49  Anion Gap. Serum 9  Blood Urea Nitrogen,Serm 23  Calcium, Total Serum 8.2  Carbon Dioxide, Serum 23  Chloride, Serum 107  Creatinine, Serum 1.00  eGFR in  --  eGFR in Non Afican American --  Gloucose, serum 160  Potassium, Serum 4.0  Sodium, Serum 139              10-29-24 @ 13:40  Anion Gap. Serum 5  Blood Urea Nitrogen,Serm 24  Calcium, Total Serum 8.5  Carbon Dioxide, Serum 25  Chloride, Serum 111  Creatinine, Serum 0.94  eGFR in  --  eGFR in Non Afican American --  Gloucose, serum 118  Potassium, Serum 3.8  Sodium, Serum 141                  CMP  10-30-24 @ 05:49  Benita Aminotransferase(ALT/SGPT)--  Albumin, Serum --  Alkaline Phosphatase, Serum --  Anion Gap, Serum 9  Aspartate Aminotransferase (AST/SGOT)--  Bilirubin Total, Serum --  Blood Urea Nitrogen, Serum 23  Calcium,Total Serum 8.2  Carbon Dioxide, Serum 23  Chloride, Serum 107  Creatinine, Serum 1.00  eGFR if  --  eGFR if Non African American --  Glucose, Serum 160  Potassium, Serum 4.0  Protein Total, Serum --  Sodium, Serum 139                      10-29-24 @ 13:40  Benita Aminotransferase(ALT/SGPT)--  Albumin, Serum --  Alkaline Phosphatase, Serum --  Anion Gap, Serum 5  Aspartate Aminotransferase (AST/SGOT)--  Bilirubin Total, Serum --  Blood Urea Nitrogen, Serum 24  Calcium,Total Serum 8.5  Carbon Dioxide, Serum 25  Chloride, Serum 111  Creatinine, Serum 0.94  eGFR if  --  eGFR if Non African American --  Glucose, Serum 118  Potassium, Serum 3.8  Protein Total, Serum --  Sodium, Serum 141                          PT/INR      Amylase/Lipase            RADIOLOGY & ADDITIONAL TESTS:    Imaging Personally Reviewed:  [ ] YES  [ ] NO

## 2024-10-31 LAB
ANION GAP SERPL CALC-SCNC: 8 MMOL/L — SIGNIFICANT CHANGE UP (ref 5–17)
APPEARANCE UR: CLEAR — SIGNIFICANT CHANGE UP
BILIRUB UR-MCNC: NEGATIVE — SIGNIFICANT CHANGE UP
BUN SERPL-MCNC: 12 MG/DL — SIGNIFICANT CHANGE UP (ref 7–18)
CALCIUM SERPL-MCNC: 9.1 MG/DL — SIGNIFICANT CHANGE UP (ref 8.4–10.5)
CHLORIDE SERPL-SCNC: 106 MMOL/L — SIGNIFICANT CHANGE UP (ref 96–108)
CO2 SERPL-SCNC: 27 MMOL/L — SIGNIFICANT CHANGE UP (ref 22–31)
COLOR SPEC: YELLOW — SIGNIFICANT CHANGE UP
CREAT SERPL-MCNC: 0.84 MG/DL — SIGNIFICANT CHANGE UP (ref 0.5–1.3)
DIFF PNL FLD: NEGATIVE — SIGNIFICANT CHANGE UP
EGFR: 72 ML/MIN/1.73M2 — SIGNIFICANT CHANGE UP
GLUCOSE BLDC GLUCOMTR-MCNC: 139 MG/DL — HIGH (ref 70–99)
GLUCOSE BLDC GLUCOMTR-MCNC: 140 MG/DL — HIGH (ref 70–99)
GLUCOSE BLDC GLUCOMTR-MCNC: 224 MG/DL — HIGH (ref 70–99)
GLUCOSE BLDC GLUCOMTR-MCNC: 230 MG/DL — HIGH (ref 70–99)
GLUCOSE SERPL-MCNC: 236 MG/DL — HIGH (ref 70–99)
GLUCOSE UR QL: 500 MG/DL
HCT VFR BLD CALC: 29.1 % — LOW (ref 34.5–45)
HGB BLD-MCNC: 10 G/DL — LOW (ref 11.5–15.5)
KETONES UR-MCNC: 15 MG/DL
LEUKOCYTE ESTERASE UR-ACNC: NEGATIVE — SIGNIFICANT CHANGE UP
MCHC RBC-ENTMCNC: 29.7 PG — SIGNIFICANT CHANGE UP (ref 27–34)
MCHC RBC-ENTMCNC: 34.4 G/DL — SIGNIFICANT CHANGE UP (ref 32–36)
MCV RBC AUTO: 86.4 FL — SIGNIFICANT CHANGE UP (ref 80–100)
NITRITE UR-MCNC: NEGATIVE — SIGNIFICANT CHANGE UP
NRBC # BLD: 0 /100 WBCS — SIGNIFICANT CHANGE UP (ref 0–0)
PH UR: 7 — SIGNIFICANT CHANGE UP (ref 5–8)
PLATELET # BLD AUTO: 131 K/UL — LOW (ref 150–400)
POTASSIUM SERPL-MCNC: 3.3 MMOL/L — LOW (ref 3.5–5.3)
POTASSIUM SERPL-SCNC: 3.3 MMOL/L — LOW (ref 3.5–5.3)
PROT UR-MCNC: NEGATIVE MG/DL — SIGNIFICANT CHANGE UP
RBC # BLD: 3.37 M/UL — LOW (ref 3.8–5.2)
RBC # FLD: 13.1 % — SIGNIFICANT CHANGE UP (ref 10.3–14.5)
SODIUM SERPL-SCNC: 141 MMOL/L — SIGNIFICANT CHANGE UP (ref 135–145)
SP GR SPEC: 1.01 — SIGNIFICANT CHANGE UP (ref 1–1.03)
UROBILINOGEN FLD QL: 0.2 MG/DL — SIGNIFICANT CHANGE UP (ref 0.2–1)
WBC # BLD: 7.93 K/UL — SIGNIFICANT CHANGE UP (ref 3.8–10.5)
WBC # FLD AUTO: 7.93 K/UL — SIGNIFICANT CHANGE UP (ref 3.8–10.5)

## 2024-10-31 PROCEDURE — 99233 SBSQ HOSP IP/OBS HIGH 50: CPT | Mod: GC

## 2024-10-31 RX ORDER — HALOPERIDOL DECANOATE 50 MG/ML
1 INJECTION INTRAMUSCULAR ONCE
Refills: 0 | Status: COMPLETED | OUTPATIENT
Start: 2024-10-31 | End: 2024-10-31

## 2024-10-31 RX ORDER — OLANZAPINE 20 MG/1
2.5 TABLET ORAL ONCE
Refills: 0 | Status: COMPLETED | OUTPATIENT
Start: 2024-10-31 | End: 2024-10-31

## 2024-10-31 RX ORDER — POTASSIUM CHLORIDE 10 MEQ
40 TABLET, EXTENDED RELEASE ORAL ONCE
Refills: 0 | Status: COMPLETED | OUTPATIENT
Start: 2024-10-31 | End: 2024-10-31

## 2024-10-31 RX ORDER — HALOPERIDOL DECANOATE 50 MG/ML
2.5 INJECTION INTRAMUSCULAR ONCE
Refills: 0 | Status: COMPLETED | OUTPATIENT
Start: 2024-10-31 | End: 2024-10-31

## 2024-10-31 RX ORDER — HALOPERIDOL DECANOATE 50 MG/ML
0.5 INJECTION INTRAMUSCULAR ONCE
Refills: 0 | Status: DISCONTINUED | OUTPATIENT
Start: 2024-10-31 | End: 2024-10-31

## 2024-10-31 RX ADMIN — Medication 4: at 08:21

## 2024-10-31 RX ADMIN — HALOPERIDOL DECANOATE 1 MILLIGRAM(S): 50 INJECTION INTRAMUSCULAR at 01:20

## 2024-10-31 RX ADMIN — HALOPERIDOL DECANOATE 2.5 MILLIGRAM(S): 50 INJECTION INTRAMUSCULAR at 03:08

## 2024-10-31 RX ADMIN — Medication 30 MILLIGRAM(S): at 22:19

## 2024-10-31 RX ADMIN — OLANZAPINE 2.5 MILLIGRAM(S): 20 TABLET ORAL at 00:17

## 2024-10-31 RX ADMIN — KETOROLAC TROMETHAMINE 15 MILLIGRAM(S): 30 INJECTION INTRAMUSCULAR; INTRAVENOUS at 08:22

## 2024-10-31 RX ADMIN — KETOROLAC TROMETHAMINE 15 MILLIGRAM(S): 30 INJECTION INTRAMUSCULAR; INTRAVENOUS at 09:37

## 2024-10-31 NOTE — PROGRESS NOTE ADULT - ATTENDING COMMENTS
Medicine consulted overnight for delirium. She was given zyprexa and haldol. SHe was placed on restraints.  Seen this AM- continued to be delirious agitated, poor orientation despite conservative measures.  SHe had mild abdominal tenderness.    #Acute delirium  - Hold further antipsychotics  - this is not an indication for psych consult  - DO NOT place restraints  - found with acute urinary retention, likely culprit of delirium.   - After bladder scan, her mentation started to improve.    #Acute urinary retention  - likely in setting of recent surgical procedure  - s/p straight cath.  - continue monitoring for ability to void.  - 1040mL in bladder  - IV fluids  - no evidence of uti, hold abx    #DM  0 uncontrolled  a1c>8  COntinue sliding scale insulin  - lifestyle changes, continue outpatient medications upon discharge  - outpatient pcp/endocrinology followup

## 2024-10-31 NOTE — PROGRESS NOTE ADULT - SUBJECTIVE AND OBJECTIVE BOX
PGY-1 Progress Note discussed with attending    PAGER #: [[phone]] TILL 5:00 PM  PLEASE CONTACT ON CALL TEAM:  - On Call Team (Please refer to Wendie) FROM 5:00 PM - 8:30PM  - Nightfloat Team FROM 8:30 -7:30 AM      INTERVAL HPI/OVERNIGHT EVENTS:   Overnight, Pt was found to be agitated s/p zyprexa and haldol which was effective in resolving agitation. pt. seen and examined with daughter at bedside, Pt was found to be disoriented and confused. As per daughter, pt had been confused in the past when she has a UTI.       REVIEW OF SYSTEMS:  Unable to assess due to encephalopathy    Vital Signs Last 24 Hrs  T(C): 36.6 (31 Oct 2024 06:40), Max: 36.7 (30 Oct 2024 20:53)  T(F): 97.8 (31 Oct 2024 06:40), Max: 98.1 (30 Oct 2024 20:53)  HR: 75 (31 Oct 2024 06:40) (65 - 101)  BP: 173/86 (31 Oct 2024 06:40) (123/65 - 175/98)  BP(mean): 115 (31 Oct 2024 06:40) (84 - 123)  RR: 18 (31 Oct 2024 06:40) (16 - 18)  SpO2: 98% (31 Oct 2024 06:40) (93% - 99%)    Parameters below as of 31 Oct 2024 06:40  Patient On (Oxygen Delivery Method): room air        PHYSICAL EXAMINATION:  GENERAL: NAD, elderly, confused disoriented with her eyes closed  HEAD:  Atraumatic, Normocephalic  EYES: EOMI, PERRLA, conjunctiva and sclera clear  NERVOUS SYSTEM:  AOX1 to self, No focal deficits  CHEST/LUNG: Lungs clear to auscultation bilaterally, No rales, rhonchi, wheezing   HEART: Regular rate and rhythm; No murmurs, rubs, or gallops  ABDOMEN: Soft, tender to palpation in the suprapubic area, Nondistended; Bowel sounds present  EXTREMITIES: No clubbing, cyanosis, or edema, R knee with dressing and ACE wrap in place  SKIN: No rashes or lesions;  Good capillary refill                             10.0   7.93  )-----------( 131      ( 31 Oct 2024 07:05 )             29.1     10-31    141  |  106  |  12  ----------------------------<  236[H]  3.3[L]   |  27  |  0.84    Ca    9.1      31 Oct 2024 07:05                CAPILLARY BLOOD GLUCOSE      RADIOLOGY & ADDITIONAL TESTS:

## 2024-10-31 NOTE — PROGRESS NOTE ADULT - SUBJECTIVE AND OBJECTIVE BOX
Orthopedic Surgery     79yFemale    Diagnosis:  S/p Robot assisted RIGHT Total Knee Replacement POD#2    24hr interval:  Patient was seen and evaluated at bedside. Overnight patient became agitated was given Haldol which helped. Patient is now resting comfortably in bed, no complaints of pain at this time  Pain is mild; localized to the RLE and well controlled medications, worse with movement and walking.   Patient has been able to void independently and pending bowel movement.     Patient was OOB with PT and walker  Denies CP/SOB, dyspnea, paresthesias, N/V/D, palpitations.       Vital Signs Last 24 Hrs  T(C): 36.6 (31 Oct 2024 06:40), Max: 36.7 (30 Oct 2024 20:53)  T(F): 97.8 (31 Oct 2024 06:40), Max: 98.1 (30 Oct 2024 20:53)  HR: 75 (31 Oct 2024 06:40) (65 - 101)  BP: 173/86 (31 Oct 2024 06:40) (123/65 - 175/98)  BP(mean): 115 (31 Oct 2024 06:40) (84 - 123)  ABP: --  ABP(mean): --  RR: 18 (31 Oct 2024 06:40) (16 - 18)  SpO2: 98% (31 Oct 2024 06:40) (93% - 99%)    O2 Parameters below as of 31 Oct 2024 06:40  Patient On (Oxygen Delivery Method): room air    I&O's Detail    30 Oct 2024 07:01  -  31 Oct 2024 07:00  --------------------------------------------------------  IN:  Total IN: 0 mL    OUT:    Voided (mL): 300 mL  Total OUT: 300 mL    Total NET: -300 mL      Physical Exam:    General: AAOx3, NAD, resting comfortably in bed.  MSK:  Right knee: Dressing is C/D/I. Wound edges are well approximated and staples are intact.   Skin is pink and warm.    No erythema.   No wound drainage.   Lower extremities:  No calf tenderness, calves are soft.   2+pulses. NVI. 5/5 Strength of EHL/TA/gastrocnemius B/L.    Good capillary refill. SILT.                                    9.1    5.93  )-----------( 126      ( 30 Oct 2024 05:49 )             27.2   10-30    139  |  107  |  23[H]  ----------------------------<  160[H]  4.0   |  23  |  1.00    Ca    8.2[L]      30 Oct 2024 05:49            Impression:  79yFemale S/p Robot assisted Right Total Knee Replacement POD#2  Plan:  -  Pain management  -  Dvt prophylaxis with Lovenox  -  Daily Physical Therapy:  WBAT of the right lower extremity with appropriate assistive device  -  Discharge planning: rehab  -  Encouraged use of incentive spirometry  -  Case d/w Dr. Salazar   Orthopedic Surgery     79yFemale    Diagnosis:  S/p Robot assisted RIGHT Total Knee Replacement POD#2    24hr interval:  Patient was seen and evaluated at bedside with daughter present.   Overnight patient had an episode of agitation was given Haldol which helped. Patient is now resting more comfortably in bed, is in one restrain LUE rainaten  Daughter states patient has had an episode similar to last night in the past after surgery.  Patient is in no apparent distress at this time, is still with mild agitation, pushing hand away initially upon examination but ultimately allowed full examination and did follow all commands  Pain is mild; localized to the RLE and well controlled medications, worse with movement and walking.   Patient was OOB with PT and walker. Patient has been able to void independently and pending bowel movement.     Pt denies Fever, Chest pain, SOB, dyspnea, paresthesias, N/V/D, abdominal pain, syncope, or pain anywhere else.       Vital Signs Last 24 Hrs  T(C): 36.6 (31 Oct 2024 06:40), Max: 36.7 (30 Oct 2024 20:53)  T(F): 97.8 (31 Oct 2024 06:40), Max: 98.1 (30 Oct 2024 20:53)  HR: 75 (31 Oct 2024 06:40) (65 - 101)  BP: 173/86 (31 Oct 2024 06:40) (123/65 - 175/98)  BP(mean): 115 (31 Oct 2024 06:40) (84 - 123)  ABP: --  ABP(mean): --  RR: 18 (31 Oct 2024 06:40) (16 - 18)  SpO2: 98% (31 Oct 2024 06:40) (93% - 99%)    O2 Parameters below as of 31 Oct 2024 06:40  Patient On (Oxygen Delivery Method): room air    I&O's Detail    30 Oct 2024 07:01  -  31 Oct 2024 07:00  --------------------------------------------------------  IN:  Total IN: 0 mL    OUT:    Voided (mL): 300 mL  Total OUT: 300 mL    Total NET: -300 mL      Physical Exam:  General: AAOx2-3, NAD, resting comfortably in bed, with some mild agitation  Right knee:  Dressing is C/D/I, ACE wrap readjusted due to movement. Skin is pink and warm. SILT.  No drainage. No active bleeding  Lower extremities:  No calf tenderness, calves are soft. 2+pulses. NVI. 5/5 Strength of EHL/FHL/ADF/APF.  Good capillary refill. SILT.                                    9.1    5.93  )-----------( 126      ( 30 Oct 2024 05:49 )             27.2   10-30    139  |  107  |  23[H]  ----------------------------<  160[H]  4.0   |  23  |  1.00    Ca    8.2[L]      30 Oct 2024 05:49            Impression:  79yFemale S/p Robot assisted Right Total Knee Replacement POD#2  Plan:  -  Pain management  -  Continue with care per medicine for agitation  -  Dvt prophylaxis with Lovenox  -  Daily Physical Therapy:  WBAT of the right lower extremity with appropriate assistive device  -  Discharge planning: rehab  -  Encouraged use of incentive spirometry  -  Case d/w Dr. Salazar

## 2024-10-31 NOTE — CHART NOTE - NSCHARTNOTEFT_GEN_A_CORE
Patient presented with significant agitation tonight. The patient's daughter was at the bedside and expressed concern, noting that this behavior is atypical for the patient. She provided valuable context, explaining that the patient has reacted similarly in the past only in situations involving opioid use or post-surgery. Additionally, she mentioned that the patient's mother had also experienced similar reactions under these conditions, suggesting a possible familial predisposition.    Initially, an attempt was made to manage the agitation with Zyprexa 2.5 mg, but this intervention proved ineffective. Consequently, Haldol 1 mg was administered, which successfully relaxed the patient. The daughter's insights were crucial in understanding the potential triggers and familial patterns of such reactions.    Given these observations, it was determined that the patient's acute agitation was not responsive to Zyprexa but did respond well to Haldol. This suggests a possible sensitivity or specific reaction to certain medications or conditions, as indicated by the family history.    The plan moving forward includes monitoring the patient's response to Haldol and vigilance for any further episodes of agitation or adverse effects. A thorough review of the patient's medication history and any recent exposure to opioids or other possible triggers will be conducted. If agitation recurs or persists, a psychiatric consultation may be considered for further evaluation.    The daughter's input will be integral in managing future episodes, and she will be informed of the strategies for monitoring and addressing similar situations. Documentation of the family history of similar reactions will be essential to guide future care decisions.    Follow-up will involve continuous monitoring of the patient's condition and arranging further appointments as necessary to reassess their mental status and address any ongoing concerns.
PRE-TJR SOCIAL/FUNCTIONAL SCREENING Via:     In Person Meet  on 10/28/2024:  Preferred Language:  English  Patient Telephone #: [phone]  EMAIL ADDRESS: [email or web address]  Insurance:  Medicare / Medicaid  Pt stated Goal for Surgery : Run after grand kids  SURGERY DETAILS:  Sx Date:  10/29/2024                                                                                           Surgery Type:  Right Knee Replacement  (JOSE robotic surgery)  Surgeon:  Dr. Martin MARTINEZ Tool:  4/12 (extended inpatient rehabilitation   Attitude towards SDD:  Poor, asking to go to rehab    SOCIAL HISTORY:  Live With:  Daughter in a private house    Stairs Required to Access (Street to Front Door) Residence: Yes; 9   Railing: either side, not close enough for both    Once Inside Pt Residence:   To Access a Bathroom:       Must Go Up 17 Stairs  To Access a Bedroom Where Pt. Can Sleep:   Must Go Up 17 Stairs  Pt. Currently Has Formal Home Health Services:    Yes ; [facility]          Telephone Number: [phone]    MOBILITY HISTORY:   Baseline Ambulation- Pt is not Independent in ambulation. came in to pre-op in a wheelchair  Pt. Owns Any Other Medical Equipment?    Yes; Pt has rolling walker and 3-in-1 commode    MEDICAL HISTORY:  Pt has any History of Back Surgery:   No   Pt has any Allergies:  No   Patient denies diagnosis of sleep apnea or use of CPAP    Pt. Pharmacy Information:  Name: [facility]                  Address: [address] [place] [address]         Telephone #: [phone]    Pt. will Require Transportation to Home Upon Discharge: Patient prefers rehab.  If she goes home, daughter will transport mom home.    For Post-Acute Care:  Pt. prefers [facility] for post-acute needs however she prefers rehab placement post-operatively.    Pt electronically sent pre- op education book "What to do before and after knee replacement".  All details of upcoming procedure discussed including, precautions, the throughput process, post-op process including transportation, home-care and follow-up as well as important information regarding blood clots, pneumonia, falls, infection and pain.  Patient given and educated on stretching strap for home exercise program.   All questions answered and pt. verbalized understanding.  Pt. has my phone number for follow up as needed.

## 2024-10-31 NOTE — PROGRESS NOTE ADULT - ASSESSMENT
79F PMH of DM, depression on Paxil, who underwent elective JOSE robot assisted right total knee replacement on 10/29/24 medicine was consulted for acute encephalopathy.     Assessment:  #Acute Encephalopathy  #s/p Robot assisted R total knee replacement on 10/29  #hypokalemia  #DM      Pt was found to be retaining 1 L on bladder scan. Encephalopathy likely in the setting of obstructive uropathy vs UTI vs hospital acquired in the setting of anesthesia and surgery  Ensure a calm and quiet environment.  Reorient the patient frequently, using clocks and calendars.  Encourage family presence for reassurance.  Ensure adequate lighting during the day to maintain normal sleep-wake cycles.  Insert Jaramillo catheter  Obtain UA, r/o UTI  Review current medications and minimize or discontinue those that may contribute to delirium, such as opioids and sedatives, if possible.  Optimize non-opioid analgesics such as acetaminophen if not contraindicated.  Appreciate pain mgt recs  K is 3.3  likely secondary to poor PO intake  replace KCl 40 mg x 1  Continue to monitor electrolytes.  f/u BMP daily  , Pt required 10 insulin coverage over the last 24h  Consider adding Lantus 5 U at bedtime, 2 U of Admelog three times a day before meals  Add Bedtime insulin sliding scale      Thank you for the opportunity to participate in the care of the patient. Internal Medicine will continue to follow during the hospitalization.  79F PMH of DM, depression on Paxil, who underwent elective JOSE robot assisted right total knee replacement on 10/29/24 medicine was consulted for acute encephalopathy.     Assessment:  #Acute Encephalopathy  #s/p Robot assisted R total knee replacement on 10/29  #hypokalemia  #DM      Pt was found to be retaining 1 L on bladder scan. Encephalopathy likely in the setting of obstructive uropathy vs UTI vs hospital acquired in the setting of anesthesia and surgery  Ensure a calm and quiet environment.  Reorient the patient frequently, using clocks and calendars.  Encourage family presence for reassurance.  Ensure adequate lighting during the day to maintain normal sleep-wake cycles.  Straight cath for residual volume  serial bladder scan  Obtain UA, r/o UTI  Review current medications and minimize or discontinue those that may contribute to delirium, such as opioids and sedatives, if possible.  Optimize non-opioid analgesics such as acetaminophen if not contraindicated.  Appreciate pain mgt recs  K is 3.3  likely secondary to poor PO intake  replace KCl 40 mg x 1  Continue to monitor electrolytes.  f/u BMP daily  , Pt required 10 insulin coverage over the last 24h  Consider adding Lantus 5 U at bedtime, 2 U of Admelog three times a day before meals  Add Bedtime insulin sliding scale      Thank you for the opportunity to participate in the care of the patient. Internal Medicine will continue to follow during the hospitalization.  79F PMH of DM, depression on Paxil, who underwent elective JOSE robot assisted right total knee replacement on 10/29/24 medicine was consulted for acute encephalopathy.     Assessment:  #Acute Encephalopathy  #s/p Robot assisted R total knee replacement on 10/29  #hypokalemia  #DM      Pt was found to be retaining 1 L on bladder scan. Encephalopathy likely in the setting of urinary retention vs UTI vs hospital acquired in the setting of anesthesia and surgery  Ensure a calm and quiet environment.  Reorient the patient frequently, using clocks and calendars.  Encourage family presence for reassurance.  Ensure adequate lighting during the day to maintain normal sleep-wake cycles.  Straight cath for residual volume  serial bladder scan  Obtain UA, r/o UTI  Review current medications and minimize or discontinue those that may contribute to delirium, such as opioids and sedatives, if possible.  Optimize non-opioid analgesics such as acetaminophen if not contraindicated.  Appreciate pain mgt recs  K is 3.3  likely secondary to poor PO intake  replace KCl 40 mg x 1  Continue to monitor electrolytes.  f/u BMP daily  , Pt required 10 insulin coverage over the last 24h  c/w ISS  Add Bedtime insulin sliding scale      Thank you for the opportunity to participate in the care of the patient. Internal Medicine will continue to follow during the hospitalization.

## 2024-11-01 ENCOUNTER — TRANSCRIPTION ENCOUNTER (OUTPATIENT)
Age: 79
End: 2024-11-01

## 2024-11-01 VITALS
SYSTOLIC BLOOD PRESSURE: 130 MMHG | DIASTOLIC BLOOD PRESSURE: 78 MMHG | TEMPERATURE: 98 F | OXYGEN SATURATION: 97 % | HEART RATE: 79 BPM | RESPIRATION RATE: 18 BRPM

## 2024-11-01 LAB
ANION GAP SERPL CALC-SCNC: 8 MMOL/L — SIGNIFICANT CHANGE UP (ref 5–17)
BUN SERPL-MCNC: 24 MG/DL — HIGH (ref 7–18)
CALCIUM SERPL-MCNC: 9 MG/DL — SIGNIFICANT CHANGE UP (ref 8.4–10.5)
CHLORIDE SERPL-SCNC: 105 MMOL/L — SIGNIFICANT CHANGE UP (ref 96–108)
CO2 SERPL-SCNC: 27 MMOL/L — SIGNIFICANT CHANGE UP (ref 22–31)
CREAT SERPL-MCNC: 0.91 MG/DL — SIGNIFICANT CHANGE UP (ref 0.5–1.3)
EGFR: 65 ML/MIN/1.73M2 — SIGNIFICANT CHANGE UP
GLUCOSE BLDC GLUCOMTR-MCNC: 167 MG/DL — HIGH (ref 70–99)
GLUCOSE BLDC GLUCOMTR-MCNC: 377 MG/DL — HIGH (ref 70–99)
GLUCOSE SERPL-MCNC: 327 MG/DL — HIGH (ref 70–99)
HCT VFR BLD CALC: 29.9 % — LOW (ref 34.5–45)
HGB BLD-MCNC: 10.2 G/DL — LOW (ref 11.5–15.5)
MCHC RBC-ENTMCNC: 29.8 PG — SIGNIFICANT CHANGE UP (ref 27–34)
MCHC RBC-ENTMCNC: 34.1 G/DL — SIGNIFICANT CHANGE UP (ref 32–36)
MCV RBC AUTO: 87.4 FL — SIGNIFICANT CHANGE UP (ref 80–100)
NRBC # BLD: 0 /100 WBCS — SIGNIFICANT CHANGE UP (ref 0–0)
PLATELET # BLD AUTO: 156 K/UL — SIGNIFICANT CHANGE UP (ref 150–400)
POTASSIUM SERPL-MCNC: 3.1 MMOL/L — LOW (ref 3.5–5.3)
POTASSIUM SERPL-SCNC: 3.1 MMOL/L — LOW (ref 3.5–5.3)
RBC # BLD: 3.42 M/UL — LOW (ref 3.8–5.2)
RBC # FLD: 13.6 % — SIGNIFICANT CHANGE UP (ref 10.3–14.5)
SODIUM SERPL-SCNC: 140 MMOL/L — SIGNIFICANT CHANGE UP (ref 135–145)
WBC # BLD: 8.35 K/UL — SIGNIFICANT CHANGE UP (ref 3.8–10.5)
WBC # FLD AUTO: 8.35 K/UL — SIGNIFICANT CHANGE UP (ref 3.8–10.5)

## 2024-11-01 PROCEDURE — 99232 SBSQ HOSP IP/OBS MODERATE 35: CPT | Mod: GC

## 2024-11-01 RX ORDER — ASPIRIN/MAG CARB/ALUMINUM AMIN 325 MG
1 TABLET ORAL
Qty: 0 | Refills: 0 | DISCHARGE

## 2024-11-01 RX ORDER — GABAPENTIN 300 MG/1
1 CAPSULE ORAL
Qty: 0 | Refills: 0 | DISCHARGE

## 2024-11-01 RX ORDER — POTASSIUM CHLORIDE 10 MEQ
40 TABLET, EXTENDED RELEASE ORAL ONCE
Refills: 0 | Status: COMPLETED | OUTPATIENT
Start: 2024-11-01 | End: 2024-11-01

## 2024-11-01 RX ORDER — PANTOPRAZOLE SODIUM 40 MG/1
1 TABLET, DELAYED RELEASE ORAL
Qty: 0 | Refills: 0 | DISCHARGE

## 2024-11-01 RX ORDER — OXYCODONE AND ACETAMINOPHEN 7.5; 325 MG/1; MG/1
1 TABLET ORAL
Qty: 0 | Refills: 0 | DISCHARGE

## 2024-11-01 RX ORDER — ACETAMINOPHEN 500 MG
1000 TABLET ORAL ONCE
Refills: 0 | Status: COMPLETED | OUTPATIENT
Start: 2024-11-01 | End: 2024-11-01

## 2024-11-01 RX ORDER — CELECOXIB 100 MG
1 CAPSULE ORAL
Qty: 0 | Refills: 0 | DISCHARGE

## 2024-11-01 RX ORDER — RIVAROXABAN 20 MG/1
1 TABLET, FILM COATED ORAL
Qty: 0 | Refills: 0 | DISCHARGE

## 2024-11-01 RX ORDER — SENNA 187 MG
1 TABLET ORAL
Qty: 0 | Refills: 0 | DISCHARGE

## 2024-11-01 RX ORDER — FERROUS SULFATE 325(65) MG
1 TABLET ORAL
Qty: 0 | Refills: 0 | DISCHARGE

## 2024-11-01 RX ORDER — POLYETHYLENE GLYCOL 3350 17 G/17G
17 POWDER, FOR SOLUTION ORAL
Qty: 0 | Refills: 0 | DISCHARGE

## 2024-11-01 RX ADMIN — Medication 200 MILLIGRAM(S): at 11:33

## 2024-11-01 RX ADMIN — Medication 30 MILLIGRAM(S): at 10:04

## 2024-11-01 RX ADMIN — Medication 200 MILLIGRAM(S): at 12:45

## 2024-11-01 RX ADMIN — KETOROLAC TROMETHAMINE 15 MILLIGRAM(S): 30 INJECTION INTRAMUSCULAR; INTRAVENOUS at 03:12

## 2024-11-01 RX ADMIN — Medication 400 MILLIGRAM(S): at 10:03

## 2024-11-01 RX ADMIN — Medication 2: at 08:20

## 2024-11-01 RX ADMIN — FOLIC ACID 1 MILLIGRAM(S): 1 TABLET ORAL at 11:34

## 2024-11-01 RX ADMIN — Medication 40 MILLIEQUIVALENT(S): at 15:04

## 2024-11-01 RX ADMIN — FAMOTIDINE 20 MILLIGRAM(S): 10 INJECTION INTRAVENOUS at 10:49

## 2024-11-01 RX ADMIN — Medication 10: at 11:34

## 2024-11-01 RX ADMIN — Medication 1000 MILLIGRAM(S): at 11:36

## 2024-11-01 RX ADMIN — Medication 500 MILLIGRAM(S): at 10:48

## 2024-11-01 RX ADMIN — TRAMADOL HYDROCHLORIDE 50 MILLIGRAM(S): 50 TABLET, COATED ORAL at 11:31

## 2024-11-01 RX ADMIN — OXYCODONE HYDROCHLORIDE 5 MILLIGRAM(S): 30 TABLET ORAL at 13:57

## 2024-11-01 RX ADMIN — Medication 325 MILLIGRAM(S): at 11:31

## 2024-11-01 RX ADMIN — TRAMADOL HYDROCHLORIDE 50 MILLIGRAM(S): 50 TABLET, COATED ORAL at 12:45

## 2024-11-01 RX ADMIN — Medication 1 TABLET(S): at 11:31

## 2024-11-01 RX ADMIN — KETOROLAC TROMETHAMINE 15 MILLIGRAM(S): 30 INJECTION INTRAMUSCULAR; INTRAVENOUS at 02:22

## 2024-11-01 NOTE — DISCHARGE NOTE NURSING/CASE MANAGEMENT/SOCIAL WORK - PATIENT PORTAL LINK FT
You can access the FollowMyHealth Patient Portal offered by [facility] by registering at the following website: [email or web address]. By joining [employer]’s FollowMyHealth portal, you will also be able to view your health information using other applications (apps) compatible with our system.

## 2024-11-01 NOTE — PROGRESS NOTE ADULT - SUBJECTIVE AND OBJECTIVE BOX
PGY-1 Progress Note discussed with attending    PAGER #: [[phone]] TILL 5:00 PM  PLEASE CONTACT ON CALL TEAM:  - On Call Team (Please refer to Wendie) FROM 5:00 PM - 8:30PM  - Nightfloat Team FROM 8:30 -7:30 AM      INTERVAL HPI/OVERNIGHT EVENTS:   Overnight, repeat bladder scan shows, pt retaining and straight cath with 500 cc draining. pt. seen and examined with daughter at bedside, offers no complaints. Mentation is improved. Pt is AOx2 (name, and place) at this time. As per daughter, she noticed improvement after straight cath.       REVIEW OF SYSTEMS:  CONSTITUTIONAL: No fever, weight loss, or fatigue  RESPIRATORY: No cough, wheezing, chills or hemoptysis; No shortness of breath  CARDIOVASCULAR: No chest pain, palpitations, dizziness, or leg swelling  GASTROINTESTINAL: No abdominal pain. No nausea, vomiting, or hematemesis; No diarrhea or constipation. No melena or hematochezia.  GENITOURINARY: No dysuria or hematuria, urinary frequency  NEUROLOGICAL: No headaches, memory loss, loss of strength, numbness, or tremors  SKIN: No itching, burning, rashes, or lesions     Vital Signs Last 24 Hrs  T(C): 36.9 (01 Nov 2024 05:45), Max: 36.9 (01 Nov 2024 05:45)  T(F): 98.4 (01 Nov 2024 05:45), Max: 98.4 (01 Nov 2024 05:45)  HR: 86 (01 Nov 2024 05:45) (86 - 92)  BP: 162/78 (01 Nov 2024 05:45) (120/74 - 162/78)  BP(mean): 101 (31 Oct 2024 20:26) (90 - 101)  RR: 18 (01 Nov 2024 05:45) (16 - 18)  SpO2: 97% (01 Nov 2024 05:45) (95% - 99%)    Parameters below as of 01 Nov 2024 05:45  Patient On (Oxygen Delivery Method): room air        PHYSICAL EXAMINATION:  GENERAL: NAD, elderly female, slight confusion  HEAD:  Atraumatic, Normocephalic  EYES: EOMI, PERRLA, conjunctiva and sclera clear  NERVOUS SYSTEM:  AOX2 (name, and place) No focal deficits  CHEST/LUNG: Lungs clear to auscultation bilaterally, No rales, rhonchi, wheezing   HEART: Regular rate and rhythm; No murmurs, rubs, or gallops  ABDOMEN: Soft, nontender, Nondistended; Bowel sounds present  EXTREMITIES: No clubbing, cyanosis, or edema, R knee with dressing and ACE wrap in place  SKIN: No rashes or lesions;  Good capillary refill                           10.0   7.93  )-----------( 131      ( 31 Oct 2024 07:05 )             29.1     10-31    141  |  106  |  12  ----------------------------<  236[H]  3.3[L]   |  27  |  0.84    Ca    9.1      31 Oct 2024 07:05                CAPILLARY BLOOD GLUCOSE      RADIOLOGY & ADDITIONAL TESTS:

## 2024-11-01 NOTE — DIETITIAN INITIAL EVALUATION ADULT - LITERATURE/VIDEOS GIVEN
Offered diabetic nutrition therapy to daughter, daughter declined, stated she also has diabetes and she will google it.

## 2024-11-01 NOTE — DIETITIAN INITIAL EVALUATION ADULT - PERTINENT MEDS FT
MEDICATIONS  (STANDING):  ascorbic acid 500 milliGRAM(s) Oral two times a day  celecoxib 200 milliGRAM(s) Oral daily  dextrose 5%. 1000 milliLiter(s) (50 mL/Hr) IV Continuous <Continuous>  dextrose 5%. 1000 milliLiter(s) (100 mL/Hr) IV Continuous <Continuous>  dextrose 50% Injectable 12.5 Gram(s) IV Push once  dextrose 50% Injectable 25 Gram(s) IV Push once  dextrose 50% Injectable 25 Gram(s) IV Push once  enoxaparin Injectable 30 milliGRAM(s) SubCutaneous every 12 hours  famotidine    Tablet 20 milliGRAM(s) Oral every 12 hours  ferrous    sulfate 325 milliGRAM(s) Oral daily  folic acid 1 milliGRAM(s) Oral daily  glucagon  Injectable 1 milliGRAM(s) IntraMuscular once  insulin lispro (ADMELOG) corrective regimen sliding scale   SubCutaneous three times a day before meals  multivitamin 1 Tablet(s) Oral daily  polyethylene glycol 3350 17 Gram(s) Oral at bedtime  senna 2 Tablet(s) Oral at bedtime  sodium chloride 0.9%. 1000 milliLiter(s) (110 mL/Hr) IV Continuous <Continuous>  traMADol 50 milliGRAM(s) Oral every 8 hours    MEDICATIONS  (PRN):  dextrose Oral Gel 15 Gram(s) Oral once PRN Blood Glucose LESS THAN 70 milliGRAM(s)/deciliter  ketorolac   Injectable 15 milliGRAM(s) IV Push every 6 hours PRN Severe Pain (7 - 10)  magnesium hydroxide Suspension 30 milliLiter(s) Oral daily PRN Constipation  melatonin 1 milliGRAM(s) Oral at bedtime PRN Insomnia  ondansetron Injectable 4 milliGRAM(s) IV Push every 6 hours PRN Nausea and/or Vomiting  oxyCODONE    IR 5 milliGRAM(s) Oral every 4 hours PRN Moderate Pain (4 - 6)

## 2024-11-01 NOTE — PROGRESS NOTE ADULT - ATTENDING COMMENTS
Medicine consulted overnight for delirium. This has improved after correction of acute urinary retention. She continues to have difficulty voiding spontaneously.      #Acute delirium  - Hold further antipsychotics  - this is not an indication for psych consult  - DO NOT place restraints  - found with acute urinary retention, likely culprit of delirium.   - After bladder scan, and straight cath her mentation started to improve.    #Acute urinary retention  - likely in setting of recent surgical procedure  - s/p straight cath.  - continue monitoring for ability to void.  - 1040mL in bladder  - IV fluids  - no evidence of uti, hold abx  - can consider tamsulosin, but first and foremost needs out of bed to chair and ambulation as tolerated.    #DM  0 uncontrolled  a1c>8  COntinue sliding scale insulin- moderate intensity  - if remains in hospital can do basal insulin 5 units qhs and prandial 2 units standing before meals with sliding scale.  - lifestyle changes, continue outpatient medications upon discharge  - outpatient pcp/endocrinology followup.

## 2024-11-01 NOTE — PROGRESS NOTE ADULT - ASSESSMENT
79F PMH of DM, depression on Paxil, who underwent elective JOSE robot assisted right total knee replacement on 10/29/24 medicine was consulted for acute encephalopathy.     Assessment:  #Acute Encephalopathy  #Urinary Retention  Pt was found to be retaining 1 L on bladder scan. Encephalopathy likely in the setting of urinary retention vs UTI vs hospital acquired in the setting of anesthesia and surgery  Ensure a calm and quiet environment.  Reorient the patient frequently, using clocks and calendars.  Encourage family presence for reassurance.  Ensure adequate lighting during the day to maintain normal sleep-wake cycles.  Pt was found to be still retaining s/p 2 straight cath with last straight cath on 11/01 at 2 AM draining 500cc.   Bladder scan at 9:30 AM on 11/01 shows 162 cc.   Advised pt and RN to get pt OOBTC and ambulate as tolerated  serial bladder scan  Jaramillo catheter insertion if pt is still retaining  UA neg, no signs of infection  Review current medications and minimize or discontinue those that may contribute to delirium, such as opioids and sedatives, if possible.  Recommend starting flomax 0.4 mg qhs    #s/p Robot assisted R total knee replacement on 10/29  Optimize non-opioid analgesics such as acetaminophen if not contraindicated.  Appreciate pain mgt recs  c/w pain meds as per pain mgmt  PT recs ZOHREH    #hypokalemia  K is 3.3  likely secondary to poor PO intake  replace KCl 40 mg x 1  Continue to monitor electrolytes.  f/u BMP daily  No labs on 11/01 as pt refused  will attempt to obtain labs to follow electrolytes    #DM  , Pt required 10 insulin coverage over the last 24h  c/w ISS  Add Bedtime insulin sliding scale      Thank you for the opportunity to participate in the care of the patient. Internal Medicine will continue to follow during the hospitalization.

## 2024-11-01 NOTE — DIETITIAN INITIAL EVALUATION ADULT - ORAL INTAKE PTA/DIET HISTORY
Attempted to speak to pt at bedside, pt with AMS. Daughter at bedside, answered nutrition questions. Pt stated she does not eat pork, forwarded to dietary. Pt does not take any vitamins or supplements at home. Pt's intake was good PTA. Reported no recent significant weight changes.  Nutrition interview: No recent episodes of nausea, vomiting, diarrhea or constipation per pt. Denies any chewing/swallowing difficulties. Intake is % per pt and per tray observation. Food preferences explored and forwarded to dietary. POCT 113-329 between 10/29 -11/1. Hba1c - 8.7%

## 2024-11-01 NOTE — DISCHARGE NOTE NURSING/CASE MANAGEMENT/SOCIAL WORK - FINANCIAL ASSISTANCE
[facility] provides services at a reduced cost to those who are determined to be eligible through [facility]’s financial assistance program. Information regarding [facility]’s financial assistance program can be found by going to [email or web address] or by calling [phone].

## 2024-11-01 NOTE — DIETITIAN INITIAL EVALUATION ADULT - NS FNS DIET ORDER
Diet, Regular:   Consistent Carbohydrate {Evening Snacks}  DASH/TLC {Sodium & Cholesterol Restricted} (10-29-24 @ 08:26) [Active]

## 2024-11-01 NOTE — DIETITIAN INITIAL EVALUATION ADULT - PERTINENT LABORATORY DATA
10-31    141  |  106  |  12  ----------------------------<  236[H]  3.3[L]   |  27  |  0.84    Ca    9.1      31 Oct 2024 07:05    POCT Blood Glucose.: 167 mg/dL (11-01-24 @ 07:57)  A1C with Estimated Average Glucose Result: 8.7 % (10-28-24 @ 10:29)

## 2024-11-01 NOTE — PROGRESS NOTE ADULT - TIME BILLING
medical management and coordination of care
Acute management of acute developing issues, assessment leading to ordering of further testing and followup. Coordination with orthopedic team. formulation of plan, medical recommendations, documentation of encounter

## 2024-11-01 NOTE — PROGRESS NOTE ADULT - REASON FOR ADMISSION
Right Total Knee Replacement
Right Total Knee Replacement
S/p Robotic assisted Right Total knee replacement POD#1
S/p Right TKA 10/29/24
Right Total Knee Replacement

## 2024-11-06 LAB — SURGICAL PATHOLOGY STUDY: SIGNIFICANT CHANGE UP

## 2024-11-26 PROCEDURE — 86850 RBC ANTIBODY SCREEN: CPT

## 2024-11-26 PROCEDURE — 88311 DECALCIFY TISSUE: CPT

## 2024-11-26 PROCEDURE — C1713: CPT

## 2024-11-26 PROCEDURE — S2900: CPT

## 2024-11-26 PROCEDURE — 97162 PT EVAL MOD COMPLEX 30 MIN: CPT

## 2024-11-26 PROCEDURE — 85027 COMPLETE CBC AUTOMATED: CPT

## 2024-11-26 PROCEDURE — C1776: CPT

## 2024-11-26 PROCEDURE — 97530 THERAPEUTIC ACTIVITIES: CPT

## 2024-11-26 PROCEDURE — 86900 BLOOD TYPING SEROLOGIC ABO: CPT

## 2024-11-26 PROCEDURE — 73560 X-RAY EXAM OF KNEE 1 OR 2: CPT

## 2024-11-26 PROCEDURE — 36415 COLL VENOUS BLD VENIPUNCTURE: CPT

## 2024-11-26 PROCEDURE — 97116 GAIT TRAINING THERAPY: CPT

## 2024-11-26 PROCEDURE — 88305 TISSUE EXAM BY PATHOLOGIST: CPT

## 2024-11-26 PROCEDURE — 86901 BLOOD TYPING SEROLOGIC RH(D): CPT

## 2024-11-26 PROCEDURE — 82962 GLUCOSE BLOOD TEST: CPT

## 2024-11-26 PROCEDURE — 80048 BASIC METABOLIC PNL TOTAL CA: CPT

## 2024-11-26 PROCEDURE — 81003 URINALYSIS AUTO W/O SCOPE: CPT

## 2025-03-11 ENCOUNTER — INPATIENT (INPATIENT)
Facility: HOSPITAL | Age: 80
LOS: 5 days | Discharge: HOME HEALTH | DRG: 177 | End: 2025-03-17
Attending: STUDENT IN AN ORGANIZED HEALTH CARE EDUCATION/TRAINING PROGRAM | Admitting: INTERNAL MEDICINE
Payer: MEDICARE

## 2025-03-11 VITALS
HEIGHT: 60 IN | DIASTOLIC BLOOD PRESSURE: 80 MMHG | OXYGEN SATURATION: 99 % | RESPIRATION RATE: 18 BRPM | TEMPERATURE: 101 F | SYSTOLIC BLOOD PRESSURE: 151 MMHG | HEART RATE: 96 BPM | WEIGHT: 105.82 LBS

## 2025-03-11 DIAGNOSIS — Z90.710 ACQUIRED ABSENCE OF BOTH CERVIX AND UTERUS: Chronic | ICD-10-CM

## 2025-03-11 DIAGNOSIS — B97.89 OTHER VIRAL AGENTS AS THE CAUSE OF DISEASES CLASSIFIED ELSEWHERE: ICD-10-CM

## 2025-03-11 LAB
ALBUMIN SERPL ELPH-MCNC: 4.3 G/DL — SIGNIFICANT CHANGE UP (ref 3.5–5.2)
ALP SERPL-CCNC: 85 U/L — SIGNIFICANT CHANGE UP (ref 30–115)
ALT FLD-CCNC: 20 U/L — SIGNIFICANT CHANGE UP (ref 0–41)
ANION GAP SERPL CALC-SCNC: 14 MMOL/L — SIGNIFICANT CHANGE UP (ref 7–14)
APPEARANCE UR: CLEAR — SIGNIFICANT CHANGE UP
BASE EXCESS BLDV CALC-SCNC: -0.3 MMOL/L — SIGNIFICANT CHANGE UP (ref -2–3)
BASOPHILS # BLD AUTO: 0.01 K/UL — SIGNIFICANT CHANGE UP (ref 0–0.2)
BASOPHILS NFR BLD AUTO: 0.2 % — SIGNIFICANT CHANGE UP (ref 0–1)
BILIRUB SERPL-MCNC: 0.7 MG/DL — SIGNIFICANT CHANGE UP (ref 0.2–1.2)
BILIRUB UR-MCNC: NEGATIVE — SIGNIFICANT CHANGE UP
BUN SERPL-MCNC: 20 MG/DL — SIGNIFICANT CHANGE UP (ref 10–20)
CALCIUM SERPL-MCNC: 9.6 MG/DL — SIGNIFICANT CHANGE UP (ref 8.4–10.5)
CHLORIDE SERPL-SCNC: 99 MMOL/L — SIGNIFICANT CHANGE UP (ref 98–110)
CO2 SERPL-SCNC: 22 MMOL/L — SIGNIFICANT CHANGE UP (ref 17–32)
COLOR SPEC: YELLOW — SIGNIFICANT CHANGE UP
CREAT SERPL-MCNC: 1.3 MG/DL — SIGNIFICANT CHANGE UP (ref 0.7–1.5)
DIFF PNL FLD: NEGATIVE — SIGNIFICANT CHANGE UP
EGFR: 42 ML/MIN/1.73M2 — LOW
EGFR: 42 ML/MIN/1.73M2 — LOW
EOSINOPHIL # BLD AUTO: 0.1 K/UL — SIGNIFICANT CHANGE UP (ref 0–0.7)
EOSINOPHIL NFR BLD AUTO: 1.8 % — SIGNIFICANT CHANGE UP (ref 0–8)
EPI CELLS # UR: 1 — SIGNIFICANT CHANGE UP
FLUAV AG NPH QL: SIGNIFICANT CHANGE UP
FLUBV AG NPH QL: SIGNIFICANT CHANGE UP
GAS PNL BLDV: SIGNIFICANT CHANGE UP
GLUCOSE SERPL-MCNC: 197 MG/DL — HIGH (ref 70–99)
GLUCOSE UR QL: NEGATIVE MG/DL — SIGNIFICANT CHANGE UP
HCO3 BLDV-SCNC: 24 MMOL/L — SIGNIFICANT CHANGE UP (ref 22–29)
HCT VFR BLD CALC: 34 % — LOW (ref 37–47)
HGB BLD-MCNC: 11.5 G/DL — LOW (ref 12–16)
IMM GRANULOCYTES NFR BLD AUTO: 0.2 % — SIGNIFICANT CHANGE UP (ref 0.1–0.3)
KETONES UR-MCNC: NEGATIVE MG/DL — SIGNIFICANT CHANGE UP
LACTATE BLDV-MCNC: 1.3 MMOL/L — SIGNIFICANT CHANGE UP (ref 0.5–2)
LEUKOCYTE ESTERASE UR-ACNC: ABNORMAL
LIDOCAIN IGE QN: 30 U/L — SIGNIFICANT CHANGE UP (ref 7–60)
LYMPHOCYTES # BLD AUTO: 1.34 K/UL — SIGNIFICANT CHANGE UP (ref 1.2–3.4)
LYMPHOCYTES # BLD AUTO: 24.1 % — SIGNIFICANT CHANGE UP (ref 20.5–51.1)
MAGNESIUM SERPL-MCNC: 2 MG/DL — SIGNIFICANT CHANGE UP (ref 1.8–2.4)
MCHC RBC-ENTMCNC: 28 PG — SIGNIFICANT CHANGE UP (ref 27–31)
MCHC RBC-ENTMCNC: 33.8 G/DL — SIGNIFICANT CHANGE UP (ref 32–37)
MCV RBC AUTO: 82.9 FL — SIGNIFICANT CHANGE UP (ref 81–99)
MONOCYTES # BLD AUTO: 0.62 K/UL — HIGH (ref 0.1–0.6)
MONOCYTES NFR BLD AUTO: 11.2 % — HIGH (ref 1.7–9.3)
NEUTROPHILS # BLD AUTO: 3.48 K/UL — SIGNIFICANT CHANGE UP (ref 1.4–6.5)
NEUTROPHILS NFR BLD AUTO: 62.5 % — SIGNIFICANT CHANGE UP (ref 42.2–75.2)
NRBC BLD AUTO-RTO: 0 /100 WBCS — SIGNIFICANT CHANGE UP (ref 0–0)
PCO2 BLDV: 38 MMHG — LOW (ref 39–42)
PH BLDV: 7.41 — SIGNIFICANT CHANGE UP (ref 7.32–7.43)
PH UR: 6 — SIGNIFICANT CHANGE UP (ref 5–8)
PLATELET # BLD AUTO: 228 K/UL — SIGNIFICANT CHANGE UP (ref 130–400)
PO2 BLDV: 41 MMHG — SIGNIFICANT CHANGE UP (ref 25–45)
POTASSIUM SERPL-MCNC: 4.1 MMOL/L — SIGNIFICANT CHANGE UP (ref 3.5–5)
POTASSIUM SERPL-SCNC: 4.1 MMOL/L — SIGNIFICANT CHANGE UP (ref 3.5–5)
PROT SERPL-MCNC: 7.7 G/DL — SIGNIFICANT CHANGE UP (ref 6–8)
PROT UR-MCNC: SIGNIFICANT CHANGE UP MG/DL
RBC # BLD: 4.1 M/UL — LOW (ref 4.2–5.4)
RBC # FLD: 14.5 % — SIGNIFICANT CHANGE UP (ref 11.5–14.5)
RBC CASTS # UR COMP ASSIST: 1 /HPF — SIGNIFICANT CHANGE UP (ref 0–4)
RSV RNA NPH QL NAA+NON-PROBE: SIGNIFICANT CHANGE UP
SAO2 % BLDV: 70.2 % — SIGNIFICANT CHANGE UP (ref 67–88)
SARS-COV-2 RNA SPEC QL NAA+PROBE: DETECTED
SODIUM SERPL-SCNC: 135 MMOL/L — SIGNIFICANT CHANGE UP (ref 135–146)
SP GR SPEC: 1.02 — SIGNIFICANT CHANGE UP (ref 1–1.03)
UROBILINOGEN FLD QL: 0.2 MG/DL — SIGNIFICANT CHANGE UP (ref 0.2–1)
WBC # BLD: 5.56 K/UL — SIGNIFICANT CHANGE UP (ref 4.8–10.8)
WBC # FLD AUTO: 5.56 K/UL — SIGNIFICANT CHANGE UP (ref 4.8–10.8)
WBC UR QL: 7 /HPF — HIGH (ref 0–5)

## 2025-03-11 PROCEDURE — 82140 ASSAY OF AMMONIA: CPT

## 2025-03-11 PROCEDURE — 82962 GLUCOSE BLOOD TEST: CPT

## 2025-03-11 PROCEDURE — 82565 ASSAY OF CREATININE: CPT

## 2025-03-11 PROCEDURE — 86140 C-REACTIVE PROTEIN: CPT

## 2025-03-11 PROCEDURE — 70450 CT HEAD/BRAIN W/O DYE: CPT | Mod: 26

## 2025-03-11 PROCEDURE — 83036 HEMOGLOBIN GLYCOSYLATED A1C: CPT

## 2025-03-11 PROCEDURE — 71045 X-RAY EXAM CHEST 1 VIEW: CPT

## 2025-03-11 PROCEDURE — 84425 ASSAY OF VITAMIN B-1: CPT

## 2025-03-11 PROCEDURE — 85025 COMPLETE CBC W/AUTO DIFF WBC: CPT

## 2025-03-11 PROCEDURE — 36415 COLL VENOUS BLD VENIPUNCTURE: CPT

## 2025-03-11 PROCEDURE — 80053 COMPREHEN METABOLIC PANEL: CPT

## 2025-03-11 PROCEDURE — 70551 MRI BRAIN STEM W/O DYE: CPT | Mod: MC

## 2025-03-11 PROCEDURE — 80076 HEPATIC FUNCTION PANEL: CPT

## 2025-03-11 PROCEDURE — 93010 ELECTROCARDIOGRAM REPORT: CPT

## 2025-03-11 PROCEDURE — 99222 1ST HOSP IP/OBS MODERATE 55: CPT

## 2025-03-11 PROCEDURE — 84443 ASSAY THYROID STIM HORMONE: CPT

## 2025-03-11 PROCEDURE — 99285 EMERGENCY DEPT VISIT HI MDM: CPT | Mod: FS

## 2025-03-11 PROCEDURE — 84436 ASSAY OF TOTAL THYROXINE: CPT

## 2025-03-11 PROCEDURE — 80048 BASIC METABOLIC PNL TOTAL CA: CPT

## 2025-03-11 PROCEDURE — 85652 RBC SED RATE AUTOMATED: CPT

## 2025-03-11 PROCEDURE — 84480 ASSAY TRIIODOTHYRONINE (T3): CPT

## 2025-03-11 RX ORDER — ACETAMINOPHEN 500 MG/5ML
975 LIQUID (ML) ORAL ONCE
Refills: 0 | Status: COMPLETED | OUTPATIENT
Start: 2025-03-11 | End: 2025-03-12

## 2025-03-11 RX ORDER — ACETAMINOPHEN 500 MG/5ML
975 LIQUID (ML) ORAL ONCE
Refills: 0 | Status: COMPLETED | OUTPATIENT
Start: 2025-03-11 | End: 2025-03-11

## 2025-03-11 RX ADMIN — Medication 2000 MILLILITER(S): at 22:51

## 2025-03-11 NOTE — ED PROVIDER NOTE - CARE PLAN
Principal Discharge DX:	2019 novel coronavirus disease (COVID-19)  Secondary Diagnosis:	Altered mental status   1

## 2025-03-11 NOTE — ED PROVIDER NOTE - CHIEF COMPLAINT
Patient used CPAP for 5.2 hrs last night
cpap hours 6.1
The patient is a 80y Female complaining of altered mental status.

## 2025-03-11 NOTE — ED PROVIDER NOTE - PHYSICAL EXAMINATION
Vital Signs: I have reviewed the initial vital signs.  Constitutional: appears stated age, no acute distress  Eyes: Sclera clear, EOMI.  Cardiovascular: S1 and S2, regular rate, regular rhythm, well-perfused extremities, radial pulses equal and 2+, pedal pulses 2+ and equal  Respiratory: unlabored respiratory effort, clear to auscultation bilaterally no wheezing, rales, or rhonchi  Gastrointestinal:  abdomen soft, non-tender  Musculoskeletal: supple neck, no lower extremity edema  Integumentary: warm, dry, no rash  Neurologic: awake, alert, oriented, extremities’ motor and sensory functions grossly intact

## 2025-03-11 NOTE — H&P ADULT - NSHPLABSRESULTS_GEN_ALL_CORE
11.5   5.56  )-----------( 228      ( 11 Mar 2025 22:40 )             34.0           135  |  99  |  20  ----------------------------<  197[H]  4.1   |  22  |  1.3    Ca    9.6      11 Mar 2025 22:40  Mg     2.0         TPro  7.7  /  Alb  4.3  /  TBili  0.7  /  DBili  x   /  AST  27  /  ALT  20  /  AlkPhos  85            Magnesium: 2.0 mg/dL (25 @ 22:40)          Urinalysis Basic - ( 11 Mar 2025 22:45 )    Color: Yellow / Appearance: Clear / S.016 / pH: x  Gluc: x / Ketone: Negative mg/dL  / Bili: Negative / Urobili: 0.2 mg/dL   Blood: x / Protein: Trace mg/dL / Nitrite: Negative   Leuk Esterase: Small / RBC: 1 /HPF / WBC 7 /HPF   Sq Epi: x / Non Sq Epi: x / Bacteria: x            Lactate Trend

## 2025-03-11 NOTE — ED PROVIDER NOTE - OBJECTIVE STATEMENT
80-year-old female past medical history right total knee replacement October 2024, OA, DM, GERD presents for altered mental status.  Per daughter, patient has been admitted recently to [facility] and discharged yesterday for sepsis.  Reports she was discharged on antibiotics for possible UTI.  Reports patient has had increased confusion and forgetfulness, prompting emergency department evaluation.  Denies chest pain, shortness of breath, abdominal pain, nausea/vomiting/diarrhea, fall/trauma. None known in lifetime

## 2025-03-11 NOTE — H&P ADULT - ASSESSMENT
81 yo female w PMHx as reviewed in the EMR who presented to the ED w her daughter w c/o increasing AMS confusion and forgetfulness     #AMS   #covid +   CTH +R BG hypodensity, will get MRI   UA negative   Remdesivir course / infectious disease consulted   isolation precautions   blood/urine cultures pending   ammonia / B12 / thyroid panel   fall precautions     # DM 2  #neuropathy   A1C   hold home metformin   gabapentin Q 8   SSI protocol   carb control diet     #HLD  #HTN  atorvastatin 40 mg   norvasc (add to profile once family brings meds am)     #urinary incontinence  oxybutinin on board     Code status: full acls   DVT ppx: SCD for now   gi ppx: protonix   baseline ambulation: independent

## 2025-03-11 NOTE — ED PROVIDER NOTE - ATTENDING APP SHARED VISIT CONTRIBUTION OF CARE
72-year-old female past medical history noted presents from home with daughter for evaluation of increased confusion and forgetfulness.  Patient with multiple admissions since knee replacement in October for UTIs and sepsis.  Patient was discharged from [facility] yesterday.  Daughter states patient is still not herself and feels she was discharged too soon.  On exam patient NAD, alert and awake, lungs CTA B/L, OP clear, abdomen soft, nontender nondistended, moves all extremities, no edema

## 2025-03-11 NOTE — H&P ADULT - HISTORY OF PRESENT ILLNESS
80-year-old female w PMHx significant for HTN, right total knee replacement October 2024, OA, DM, neuropathy, GERD presents for altered mental status.  Per daughter, patient has been admitted recently to [facility] and discharged yesterday for sepsis. Reportedly, patient discharged on abx, daughter unsure of which one; pt did not take home dose given patient developed fever and worsening confusion/forgetfulness today and came to ED. Patient unable to provide ROS. Daughter noted patient has not complained of nausea / vomiting; CP palpitations SOB cough or generalized body aches.     ED labs actionable for glucose 197. Viral panel + covid 19. CTH + Focal hypodensity within the right basal ganglia region, may reflect age indeterminate lacunar infarct. . CXR pending. Will admit to hospitalist service for medical management.

## 2025-03-11 NOTE — H&P ADULT - NSHPPHYSICALEXAM_GEN_ALL_CORE
VITALS:   T(C): 38.2 (03-11-25 @ 21:49), Max: 38.2 (03-11-25 @ 21:49)  HR: 88 (03-11-25 @ 22:51) (88 - 96)  BP: 141/68 (03-11-25 @ 22:51) (141/68 - 151/80)  RR: 18 (03-11-25 @ 22:51) (18 - 18)  SpO2: 99% (03-11-25 @ 22:51) (99% - 99%)    GENERAL: NAD, lying in bed comfortably asleep, easily aroused   HEAD:  Atraumatic, normocephalic  EYES: EOMI, PERRLA, conjunctiva and sclera clear  ENT: Moist mucous membranes  NECK: Supple, no JVD  HEART: Regular rate and rhythm, no murmurs, rubs, or gallops  LUNGS: Unlabored respirations.  Clear to auscultation bilaterally, no crackles, wheezing, or rhonchi  ABDOMEN: Soft, nontender, nondistended, +BS  EXTREMITIES:   No clubbing, cyanosis, or edema  NERVOUS SYSTEM:  A&Ox3, no focal deficits   SKIN: + R knee surgical scarring

## 2025-03-11 NOTE — ED ADULT NURSE NOTE - NSFALLHARMRISKINTERV_ED_ALL_ED
Assistance OOB with selected safe patient handling equipment if applicable/Assistance with ambulation/Communicate risk of Fall with Harm to all staff, patient, and family/Monitor gait and stability/Monitor for mental status changes and reorient to person, place, and time, as needed/Provide visual cue: red socks, yellow wristband, yellow gown, etc/Reinforce activity limits and safety measures with patient and family/Toileting schedule using arm’s reach rule for commode and bathroom/Use of alarms - bed, stretcher, chair and/or video monitoring/Bed in lowest position, wheels locked, appropriate side rails in place/Call bell, personal items and telephone in reach/Instruct patient to call for assistance before getting out of bed/chair/stretcher/Non-slip footwear applied when patient is off stretcher/[place] to call system/Physically safe environment - no spills, clutter or unnecessary equipment/Purposeful Proactive Rounding/Room/bathroom lighting operational, light cord in reach

## 2025-03-11 NOTE — ED PROVIDER NOTE - CLINICAL SUMMARY MEDICAL DECISION MAKING FREE TEXT BOX
Labs are obtained.  CT scan head done.  No acute findings.  Patient found to be COVID-positive.  O2 sat has been normal.  Patient will require admission at this time.

## 2025-03-12 PROBLEM — E11.9 TYPE 2 DIABETES MELLITUS WITHOUT COMPLICATIONS: Chronic | Status: ACTIVE | Noted: 2024-10-28

## 2025-03-12 PROBLEM — M19.90 UNSPECIFIED OSTEOARTHRITIS, UNSPECIFIED SITE: Chronic | Status: ACTIVE | Noted: 2024-10-28

## 2025-03-12 LAB
A1C WITH ESTIMATED AVERAGE GLUCOSE RESULT: 9.1 % — HIGH (ref 4–5.6)
ALBUMIN SERPL ELPH-MCNC: 4 G/DL — SIGNIFICANT CHANGE UP (ref 3.5–5.2)
ALP SERPL-CCNC: 82 U/L — SIGNIFICANT CHANGE UP (ref 30–115)
ALT FLD-CCNC: 22 U/L — SIGNIFICANT CHANGE UP (ref 0–41)
AMMONIA BLD-MCNC: 19 UMOL/L — SIGNIFICANT CHANGE UP (ref 11–55)
ANION GAP SERPL CALC-SCNC: 12 MMOL/L — SIGNIFICANT CHANGE UP (ref 7–14)
AST SERPL-CCNC: 29 U/L — SIGNIFICANT CHANGE UP (ref 0–41)
BASOPHILS # BLD AUTO: 0.01 K/UL — SIGNIFICANT CHANGE UP (ref 0–0.2)
BASOPHILS NFR BLD AUTO: 0.2 % — SIGNIFICANT CHANGE UP (ref 0–1)
BILIRUB DIRECT SERPL-MCNC: 0.2 MG/DL — SIGNIFICANT CHANGE UP (ref 0–0.3)
BILIRUB INDIRECT FLD-MCNC: 0.4 MG/DL — SIGNIFICANT CHANGE UP (ref 0.2–1.2)
BILIRUB SERPL-MCNC: 0.6 MG/DL — SIGNIFICANT CHANGE UP (ref 0.2–1.2)
BUN SERPL-MCNC: 18 MG/DL — SIGNIFICANT CHANGE UP (ref 10–20)
CALCIUM SERPL-MCNC: 9.5 MG/DL — SIGNIFICANT CHANGE UP (ref 8.4–10.5)
CHLORIDE SERPL-SCNC: 105 MMOL/L — SIGNIFICANT CHANGE UP (ref 98–110)
CO2 SERPL-SCNC: 24 MMOL/L — SIGNIFICANT CHANGE UP (ref 17–32)
CREAT SERPL-MCNC: 1 MG/DL — SIGNIFICANT CHANGE UP (ref 0.7–1.5)
CRP SERPL-MCNC: 14.3 MG/L — HIGH
EGFR: 58 ML/MIN/1.73M2 — LOW
EOSINOPHIL NFR BLD AUTO: 3 % — SIGNIFICANT CHANGE UP (ref 0–8)
ERYTHROCYTE [SEDIMENTATION RATE] IN BLOOD: 37 MM/HR — HIGH (ref 0–20)
ESTIMATED AVERAGE GLUCOSE: 214 MG/DL — HIGH (ref 68–114)
GLUCOSE BLDC GLUCOMTR-MCNC: 109 MG/DL — HIGH (ref 70–99)
GLUCOSE BLDC GLUCOMTR-MCNC: 123 MG/DL — HIGH (ref 70–99)
GLUCOSE BLDC GLUCOMTR-MCNC: 134 MG/DL — HIGH (ref 70–99)
GLUCOSE BLDC GLUCOMTR-MCNC: 157 MG/DL — HIGH (ref 70–99)
GLUCOSE BLDC GLUCOMTR-MCNC: 99 MG/DL — SIGNIFICANT CHANGE UP (ref 70–99)
GLUCOSE SERPL-MCNC: 103 MG/DL — HIGH (ref 70–99)
HCT VFR BLD CALC: 33.6 % — LOW (ref 37–47)
HGB BLD-MCNC: 11.2 G/DL — LOW (ref 12–16)
IMM GRANULOCYTES NFR BLD AUTO: 0 % — LOW (ref 0.1–0.3)
LYMPHOCYTES # BLD AUTO: 2.09 K/UL — SIGNIFICANT CHANGE UP (ref 1.2–3.4)
LYMPHOCYTES # BLD AUTO: 44.9 % — SIGNIFICANT CHANGE UP (ref 20.5–51.1)
MCHC RBC-ENTMCNC: 27.9 PG — SIGNIFICANT CHANGE UP (ref 27–31)
MCHC RBC-ENTMCNC: 33.3 G/DL — SIGNIFICANT CHANGE UP (ref 32–37)
MONOCYTES # BLD AUTO: 0.61 K/UL — HIGH (ref 0.1–0.6)
MONOCYTES NFR BLD AUTO: 13.1 % — HIGH (ref 1.7–9.3)
NEUTROPHILS # BLD AUTO: 1.8 K/UL — SIGNIFICANT CHANGE UP (ref 1.4–6.5)
NEUTROPHILS NFR BLD AUTO: 38.8 % — LOW (ref 42.2–75.2)
NRBC BLD AUTO-RTO: 0 /100 WBCS — SIGNIFICANT CHANGE UP (ref 0–0)
PLATELET # BLD AUTO: 215 K/UL — SIGNIFICANT CHANGE UP (ref 130–400)
PMV BLD: 10 FL — SIGNIFICANT CHANGE UP (ref 7.4–10.4)
POTASSIUM SERPL-MCNC: 4.3 MMOL/L — SIGNIFICANT CHANGE UP (ref 3.5–5)
POTASSIUM SERPL-SCNC: 4.3 MMOL/L — SIGNIFICANT CHANGE UP (ref 3.5–5)
PROT SERPL-MCNC: 7 G/DL — SIGNIFICANT CHANGE UP (ref 6–8)
RBC # BLD: 4.01 M/UL — LOW (ref 4.2–5.4)
SODIUM SERPL-SCNC: 141 MMOL/L — SIGNIFICANT CHANGE UP (ref 135–146)
T3 SERPL-MCNC: 82 NG/DL — SIGNIFICANT CHANGE UP (ref 80–200)
T4 AB SER-ACNC: 7.5 UG/DL — SIGNIFICANT CHANGE UP (ref 4.6–12)
TSH SERPL-MCNC: 0.51 UIU/ML — SIGNIFICANT CHANGE UP (ref 0.27–4.2)
WBC # BLD: 4.65 K/UL — LOW (ref 4.8–10.8)
WBC # FLD AUTO: 4.65 K/UL — LOW (ref 4.8–10.8)

## 2025-03-12 PROCEDURE — 70551 MRI BRAIN STEM W/O DYE: CPT | Mod: 26

## 2025-03-12 PROCEDURE — 71045 X-RAY EXAM CHEST 1 VIEW: CPT | Mod: 26

## 2025-03-12 PROCEDURE — 99232 SBSQ HOSP IP/OBS MODERATE 35: CPT

## 2025-03-12 RX ORDER — ONDANSETRON HCL/PF 4 MG/2 ML
4 VIAL (ML) INJECTION EVERY 8 HOURS
Refills: 0 | Status: DISCONTINUED | OUTPATIENT
Start: 2025-03-12 | End: 2025-03-17

## 2025-03-12 RX ORDER — REMDESIVIR 5 MG/ML
100 INJECTION INTRAVENOUS EVERY 24 HOURS
Refills: 0 | Status: COMPLETED | OUTPATIENT
Start: 2025-03-13 | End: 2025-03-14

## 2025-03-12 RX ORDER — SODIUM CHLORIDE 9 G/1000ML
1000 INJECTION, SOLUTION INTRAVENOUS
Refills: 0 | Status: DISCONTINUED | OUTPATIENT
Start: 2025-03-12 | End: 2025-03-17

## 2025-03-12 RX ORDER — INSULIN LISPRO 100 U/ML
INJECTION, SOLUTION INTRAVENOUS; SUBCUTANEOUS
Refills: 0 | Status: DISCONTINUED | OUTPATIENT
Start: 2025-03-12 | End: 2025-03-17

## 2025-03-12 RX ORDER — DEXTROSE 50 % IN WATER 50 %
25 SYRINGE (ML) INTRAVENOUS ONCE
Refills: 0 | Status: DISCONTINUED | OUTPATIENT
Start: 2025-03-12 | End: 2025-03-17

## 2025-03-12 RX ORDER — ACETAMINOPHEN 500 MG/5ML
650 LIQUID (ML) ORAL EVERY 6 HOURS
Refills: 0 | Status: DISCONTINUED | OUTPATIENT
Start: 2025-03-12 | End: 2025-03-17

## 2025-03-12 RX ORDER — REMDESIVIR 5 MG/ML
INJECTION INTRAVENOUS
Refills: 0 | Status: DISCONTINUED | OUTPATIENT
Start: 2025-03-12 | End: 2025-03-12

## 2025-03-12 RX ORDER — GLUCAGON 3 MG/1
1 POWDER NASAL ONCE
Refills: 0 | Status: DISCONTINUED | OUTPATIENT
Start: 2025-03-12 | End: 2025-03-17

## 2025-03-12 RX ORDER — DEXTROSE 50 % IN WATER 50 %
12.5 SYRINGE (ML) INTRAVENOUS ONCE
Refills: 0 | Status: DISCONTINUED | OUTPATIENT
Start: 2025-03-12 | End: 2025-03-17

## 2025-03-12 RX ORDER — DEXTROSE 50 % IN WATER 50 %
15 SYRINGE (ML) INTRAVENOUS ONCE
Refills: 0 | Status: DISCONTINUED | OUTPATIENT
Start: 2025-03-12 | End: 2025-03-17

## 2025-03-12 RX ORDER — MAGNESIUM, ALUMINUM HYDROXIDE 200-200 MG
30 TABLET,CHEWABLE ORAL EVERY 4 HOURS
Refills: 0 | Status: DISCONTINUED | OUTPATIENT
Start: 2025-03-12 | End: 2025-03-17

## 2025-03-12 RX ORDER — REMDESIVIR 5 MG/ML
200 INJECTION INTRAVENOUS EVERY 24 HOURS
Refills: 0 | Status: COMPLETED | OUTPATIENT
Start: 2025-03-12 | End: 2025-03-12

## 2025-03-12 RX ORDER — OXYBUTYNIN CHLORIDE 5 MG/1
5 TABLET, FILM COATED, EXTENDED RELEASE ORAL DAILY
Refills: 0 | Status: DISCONTINUED | OUTPATIENT
Start: 2025-03-12 | End: 2025-03-17

## 2025-03-12 RX ORDER — MELATONIN 5 MG
3 TABLET ORAL AT BEDTIME
Refills: 0 | Status: DISCONTINUED | OUTPATIENT
Start: 2025-03-12 | End: 2025-03-17

## 2025-03-12 RX ORDER — REMDESIVIR 5 MG/ML
INJECTION INTRAVENOUS
Refills: 0 | Status: COMPLETED | OUTPATIENT
Start: 2025-03-12 | End: 2025-03-14

## 2025-03-12 RX ORDER — GABAPENTIN 400 MG/1
300 CAPSULE ORAL THREE TIMES A DAY
Refills: 0 | Status: DISCONTINUED | OUTPATIENT
Start: 2025-03-12 | End: 2025-03-17

## 2025-03-12 RX ORDER — INSULIN LISPRO 100 U/ML
INJECTION, SOLUTION INTRAVENOUS; SUBCUTANEOUS AT BEDTIME
Refills: 0 | Status: DISCONTINUED | OUTPATIENT
Start: 2025-03-12 | End: 2025-03-17

## 2025-03-12 RX ADMIN — GABAPENTIN 300 MILLIGRAM(S): 400 CAPSULE ORAL at 21:26

## 2025-03-12 RX ADMIN — GABAPENTIN 300 MILLIGRAM(S): 400 CAPSULE ORAL at 05:39

## 2025-03-12 RX ADMIN — REMDESIVIR 200 MILLIGRAM(S): 5 INJECTION INTRAVENOUS at 12:10

## 2025-03-12 RX ADMIN — Medication 650 MILLIGRAM(S): at 09:43

## 2025-03-12 RX ADMIN — GABAPENTIN 300 MILLIGRAM(S): 400 CAPSULE ORAL at 14:13

## 2025-03-12 RX ADMIN — Medication 975 MILLIGRAM(S): at 00:31

## 2025-03-12 RX ADMIN — Medication 975 MILLIGRAM(S): at 00:01

## 2025-03-12 RX ADMIN — Medication 1 APPLICATION(S): at 05:46

## 2025-03-12 RX ADMIN — INSULIN LISPRO 1: 100 INJECTION, SOLUTION INTRAVENOUS; SUBCUTANEOUS at 16:44

## 2025-03-12 RX ADMIN — Medication 650 MILLIGRAM(S): at 09:09

## 2025-03-12 RX ADMIN — OXYBUTYNIN CHLORIDE 5 MILLIGRAM(S): 5 TABLET, FILM COATED, EXTENDED RELEASE ORAL at 12:09

## 2025-03-12 NOTE — CONSULT NOTE ADULT - SUBJECTIVE AND OBJECTIVE BOX
INFECTIOUS DISEASE CONSULT NOTE    Patient is a 80y old  Female who presents with a chief complaint of AMS (11 Mar 2025 23:34)    HPI:  80-year-old female w PMHx significant for HTN, right total knee replacement October 2024, OA, DM, neuropathy, GERD presents for altered mental status.  Per daughter, patient has been admitted recently to [facility] and discharged yesterday for sepsis. Reportedly, patient discharged on abx, daughter unsure of which one; pt did not take home dose given patient developed fever and worsening confusion/forgetfulness today and came to ED. Patient unable to provide ROS. Daughter noted patient has not complained of nausea / vomiting; CP palpitations SOB cough or generalized body aches.     ED labs actionable for glucose 197. Viral panel + covid 19. CTH + Focal hypodensity within the right basal ganglia region, may reflect age indeterminate lacunar infarct. . CXR pending. Will admit to hospitalist service for medical management.    (11 Mar 2025 23:34)         Prior hospital charts reviewed [Yes]  Primary team notes reviewed [Yes]  Other consultant notes reviewed [Yes]    REVIEW OF SYSTEMS:      PAST MEDICAL & SURGICAL HISTORY:  DM (diabetes mellitus)      OA (osteoarthritis)      H/O total hysterectomy      History of cholecystectomy          SOCIAL HISTORY:  - Born in [place], migrated [place] in 19XX  - Currently working as / Retired  - Lives with _____; no pets  - No recent travel  - Denies tobacco use  - Denies alcohol use  - Denies illicit drug use  - Currently sexually active, has one male/female sexual partner    FAMILY HISTORY:      Allergies:  No Known Allergies      ANTIMICROBIALS:  remdesivir  IVPB        ANTIMICROBIALS (past 90 days):  MEDICATIONS  (STANDING):        OTHER MEDS:   MEDICATIONS  (STANDING):  acetaminophen     Tablet .. 650 every 6 hours PRN  aluminum hydroxide/magnesium hydroxide/simethicone Suspension 30 every 4 hours PRN  dextrose 50% Injectable 25 once  dextrose 50% Injectable 12.5 once  dextrose 50% Injectable 25 once  dextrose Oral Gel 15 once PRN  gabapentin 300 three times a day  glucagon  Injectable 1 once  insulin lispro (ADMELOG) corrective regimen sliding scale  three times a day before meals  insulin lispro (ADMELOG) corrective regimen sliding scale  at bedtime  melatonin 3 at bedtime PRN  ondansetron Injectable 4 every 8 hours PRN  oxybutynin 5 daily  pantoprazole    Tablet 40 before breakfast PRN      VITALS:  Vital Signs Last 24 Hrs  T(F): 98.4 (03-12-25 @ 05:00), Max: 100.7 (03-11-25 @ 21:49)    Vital Signs Last 24 Hrs  HR: 66 (03-12-25 @ 05:00) (66 - 96)  BP: 147/77 (03-12-25 @ 05:00) (141/68 - 151/80)  RR: 18 (03-12-25 @ 05:00)  SpO2: 100% (03-12-25 @ 05:00) (99% - 100%)  Wt(kg): --    EXAM:    Labs:                        11.2   4.65  )-----------( 215      ( 12 Mar 2025 06:52 )             33.6     03-12    141  |  105  |  18  ----------------------------<  103[H]  4.3   |  24  |  1.0    Ca    9.5      12 Mar 2025 06:52  Mg     2.0     03-11    TPro  7.0  /  Alb  4.0  /  TBili  0.6  /  DBili  0.2  /  AST  29  /  ALT  22  /  AlkPhos  82  03-12      WBC Trend:  WBC Count: 4.65 (03-12-25 @ 06:52)  WBC Count: 5.56 (03-11-25 @ 22:40)          Creatine Trend:  Creatinine: 1.0 (03-12)  Creatinine: 1.0 (03-12)  Creatinine: 1.3 (03-11)      Liver Biochemical Testing Trend:  Alanine Aminotransferase (ALT/SGPT): 22 (03-12)  Alanine Aminotransferase (ALT/SGPT): 20 (03-11)  Aspartate Aminotransferase (AST/SGOT): 29 (03-12-25 @ 06:52)  Aspartate Aminotransferase (AST/SGOT): 27 (03-11-25 @ 22:40)  Bilirubin Direct: 0.2 (03-12)  Bilirubin Total: 0.6 (03-12)  Bilirubin Total: 0.7 (03-11)      Trend LDH          Urinalysis Basic - ( 12 Mar 2025 06:52 )    Color: x / Appearance: x / SG: x / pH: x  Gluc: 103 mg/dL / Ketone: x  / Bili: x / Urobili: x   Blood: x / Protein: x / Nitrite: x   Leuk Esterase: x / RBC: x / WBC x   Sq Epi: x / Non Sq Epi: x / Bacteria: x          MICROBIOLOGY:    MRSA PCR Result.: NotDetec (10-28-24 @ 11:00)      Urinalysis with Rflx Culture (collected 11 Mar 2025 22:45)      C-Reactive Protein: 14.3 (03-12)      Blood Gas Venous - Lactate: 1.3 (03-11 @ 22:52)    A1C with Estimated Average Glucose Result: 8.7 % (10-28-24 @ 10:29)    Sedimentation Rate, Erythrocyte: 37 mm/Hr (03-12-25 @ 06:52)      RADIOLOGY:  imaging below personally reviewed     INFECTIOUS DISEASE CONSULT NOTE    Patient is a 80y old  Female who presents with a chief complaint of AMS (11 Mar 2025 23:34)    HPI:  80-year-old female w PMHx significant for HTN, right total knee replacement October 2024, OA, DM, neuropathy, GERD presents for altered mental status.  Per daughter, patient has been admitted recently to [facility] and discharged yesterday for sepsis. Reportedly, patient discharged on abx, daughter unsure of which one; pt did not take home dose given patient developed fever and worsening confusion/forgetfulness today and came to ED. Patient unable to provide ROS. Daughter noted patient has not complained of nausea / vomiting; CP palpitations SOB cough or generalized body aches.     ED labs actionable for glucose 197. Viral panel + covid 19. CTH + Focal hypodensity within the right basal ganglia region, may reflect age indeterminate lacunar infarct. . CXR pending. Will admit to hospitalist service for medical management.    (11 Mar 2025 23:34)     Prior hospital charts reviewed [Yes]  Primary team notes reviewed [Yes]  Other consultant notes reviewed [Yes]    REVIEW OF SYSTEMS:  Unable to provide due to cognitive impairment      PAST MEDICAL & SURGICAL HISTORY:  DM (diabetes mellitus)      OA (osteoarthritis)      H/O total hysterectomy      History of cholecystectomy          SOCIAL HISTORY:  Unable to provide due to cognitive impairment      FAMILY HISTORY:  Unable to provide due to cognitive impairment      Allergies:  No Known Allergies      ANTIMICROBIALS:  remdesivir  IVPB        ANTIMICROBIALS (past 90 days):  MEDICATIONS  (STANDING):        OTHER MEDS:   MEDICATIONS  (STANDING):  acetaminophen     Tablet .. 650 every 6 hours PRN  aluminum hydroxide/magnesium hydroxide/simethicone Suspension 30 every 4 hours PRN  dextrose 50% Injectable 25 once  dextrose 50% Injectable 12.5 once  dextrose 50% Injectable 25 once  dextrose Oral Gel 15 once PRN  gabapentin 300 three times a day  glucagon  Injectable 1 once  insulin lispro (ADMELOG) corrective regimen sliding scale  three times a day before meals  insulin lispro (ADMELOG) corrective regimen sliding scale  at bedtime  melatonin 3 at bedtime PRN  ondansetron Injectable 4 every 8 hours PRN  oxybutynin 5 daily  pantoprazole    Tablet 40 before breakfast PRN      VITALS:  Vital Signs Last 24 Hrs  T(F): 98.4 (03-12-25 @ 05:00), Max: 100.7 (03-11-25 @ 21:49)    Vital Signs Last 24 Hrs  HR: 66 (03-12-25 @ 05:00) (66 - 96)  BP: 147/77 (03-12-25 @ 05:00) (141/68 - 151/80)  RR: 18 (03-12-25 @ 05:00)  SpO2: 100% (03-12-25 @ 05:00) (99% - 100%)  Wt(kg): --    EXAM:  GENERAL: NAD, lying in bed  HEAD: No head lesions  EYES: Conjunctiva pink and cornea white  EAR, NOSE, MOUTH, THROAT: Normal external ears and nose, no discharges; moist mucous membranes  NECK: Supple, nontender to palpation; no JVD  RESPIRATORY: Clear to auscultation bilaterally  CARDIOVASCULAR: S1 S2  GASTROINTESTINAL: Soft, nontender, nondistended; normoactive bowel sounds  GENITOURINARY: No garcia catheter, no CVA tenderness  EXTREMITIES: No clubbing, cyanosis, or petal edema  NERVOUS SYSTEM: Sleepy but arousable  MUSCULOSKELETAL: No joint erythema, swelling or pain  SKIN: No rashes or lesions, no superficial thrombophlebitis  PSYCH: Calm    Labs:                        11.2   4.65  )-----------( 215      ( 12 Mar 2025 06:52 )             33.6     03-12    141  |  105  |  18  ----------------------------<  103[H]  4.3   |  24  |  1.0    Ca    9.5      12 Mar 2025 06:52  Mg     2.0     03-11    TPro  7.0  /  Alb  4.0  /  TBili  0.6  /  DBili  0.2  /  AST  29  /  ALT  22  /  AlkPhos  82  03-12      WBC Trend:  WBC Count: 4.65 (03-12-25 @ 06:52)  WBC Count: 5.56 (03-11-25 @ 22:40)          Creatine Trend:  Creatinine: 1.0 (03-12)  Creatinine: 1.0 (03-12)  Creatinine: 1.3 (03-11)      Liver Biochemical Testing Trend:  Alanine Aminotransferase (ALT/SGPT): 22 (03-12)  Alanine Aminotransferase (ALT/SGPT): 20 (03-11)  Aspartate Aminotransferase (AST/SGOT): 29 (03-12-25 @ 06:52)  Aspartate Aminotransferase (AST/SGOT): 27 (03-11-25 @ 22:40)  Bilirubin Direct: 0.2 (03-12)  Bilirubin Total: 0.6 (03-12)  Bilirubin Total: 0.7 (03-11)      Trend LDH          Urinalysis Basic - ( 12 Mar 2025 06:52 )    Color: x / Appearance: x / SG: x / pH: x  Gluc: 103 mg/dL / Ketone: x  / Bili: x / Urobili: x   Blood: x / Protein: x / Nitrite: x   Leuk Esterase: x / RBC: x / WBC x   Sq Epi: x / Non Sq Epi: x / Bacteria: x          MICROBIOLOGY:    MRSA PCR Result.: NotDetec (10-28-24 @ 11:00)      Urinalysis with Rflx Culture (collected 11 Mar 2025 22:45)      C-Reactive Protein: 14.3 (03-12)      Blood Gas Venous - Lactate: 1.3 (03-11 @ 22:52)    A1C with Estimated Average Glucose Result: 8.7 % (10-28-24 @ 10:29)    Sedimentation Rate, Erythrocyte: 37 mm/Hr (03-12-25 @ 06:52)      RADIOLOGY:  imaging below personally reviewed    < from: Xray Chest 1 View-PORTABLE IMMEDIATE (03.12.25 @ 01:26) >  IMPRESSION:    No radiographic evidence of acute cardiopulmonary disease.    < end of copied text >    < from: CT Head No Cont (03.11.25 @ 23:10) >  IMPRESSION:  1.  No evidence of acute intracranial pathology.  2.  Focal hypodensity within the right head of caudate, may reflect age   indeterminate lacunar infarct.  3.  Sinus mucosal disease most notable in the ethmoid sinuses.    < end of copied text >

## 2025-03-12 NOTE — PATIENT PROFILE ADULT - NSPROGENPREVTRANSF_GEN_A_NUR
Addended by: NICOLE ROWLAND on: 4/20/2021 02:10 PM     Modules accepted: Orders     unable to obtain

## 2025-03-12 NOTE — CONSULT NOTE ADULT - ASSESSMENT
80-year-old female w PMHx significant for HTN, right total knee replacement October 2024, OA, DM, neuropathy, GERD presents for altered mental status.     ID is consulted for COVID  Febrile to 100.7  WBC 4.65  On room air  COVID PCR +  BCx pending  UA WBC 7, UCx pending  ESR 34/CRP 14.3    CXR no PNA      IMPRESSION:      RECOMMENDATIONS:        * THIS IS AN INCOMPLETE NOTE. FINAL RECOMMENDATION IS PENDING *   80-year-old female w PMHx significant for HTN, right total knee replacement October 2024, OA, DM, neuropathy, GERD presents for altered mental status.     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  Attending Physician  Division of Infectious Diseases  [facility]  Please contact me via [employer] Teams

## 2025-03-12 NOTE — PATIENT PROFILE ADULT - FALL HARM RISK - HARM RISK INTERVENTIONS
Assistance with ambulation/Assistance OOB with selected safe patient handling equipment/Communicate Risk of Fall with Harm to all staff/Discuss with provider need for PT consult/Monitor gait and stability/Reinforce activity limits and safety measures with patient and family/Tailored Fall Risk Interventions/Visual Cue: Yellow wristband and red socks/Bed in lowest position, wheels locked, appropriate side rails in place/Call bell, personal items and telephone in reach/Instruct patient to call for assistance before getting out of bed or chair/Non-slip footwear when patient is out of bed/[place] to call system/Physically safe environment - no spills, clutter or unnecessary equipment/Purposeful Proactive Rounding/Room/bathroom lighting operational, light cord in reach

## 2025-03-13 LAB
ALBUMIN SERPL ELPH-MCNC: 3.9 G/DL — SIGNIFICANT CHANGE UP (ref 3.5–5.2)
ALP SERPL-CCNC: 79 U/L — SIGNIFICANT CHANGE UP (ref 30–115)
ALT FLD-CCNC: 22 U/L — SIGNIFICANT CHANGE UP (ref 0–41)
ANION GAP SERPL CALC-SCNC: 13 MMOL/L — SIGNIFICANT CHANGE UP (ref 7–14)
AST SERPL-CCNC: 29 U/L — SIGNIFICANT CHANGE UP (ref 0–41)
BASOPHILS # BLD AUTO: 0.02 K/UL — SIGNIFICANT CHANGE UP (ref 0–0.2)
BASOPHILS NFR BLD AUTO: 0.4 % — SIGNIFICANT CHANGE UP (ref 0–1)
BILIRUB DIRECT SERPL-MCNC: <0.2 MG/DL — SIGNIFICANT CHANGE UP (ref 0–0.3)
BILIRUB INDIRECT FLD-MCNC: SIGNIFICANT CHANGE UP MG/DL (ref 0.2–1.2)
BILIRUB SERPL-MCNC: <0.2 MG/DL — SIGNIFICANT CHANGE UP (ref 0.2–1.2)
BUN SERPL-MCNC: 23 MG/DL — HIGH (ref 10–20)
CALCIUM SERPL-MCNC: 9.4 MG/DL — SIGNIFICANT CHANGE UP (ref 8.4–10.5)
CHLORIDE SERPL-SCNC: 103 MMOL/L — SIGNIFICANT CHANGE UP (ref 98–110)
CO2 SERPL-SCNC: 20 MMOL/L — SIGNIFICANT CHANGE UP (ref 17–32)
CREAT SERPL-MCNC: 1 MG/DL — SIGNIFICANT CHANGE UP (ref 0.7–1.5)
CREAT SERPL-MCNC: 1 MG/DL — SIGNIFICANT CHANGE UP (ref 0.7–1.5)
EGFR: 58 ML/MIN/1.73M2 — LOW
EOSINOPHIL # BLD AUTO: 0.28 K/UL — SIGNIFICANT CHANGE UP (ref 0–0.7)
EOSINOPHIL NFR BLD AUTO: 5.4 % — SIGNIFICANT CHANGE UP (ref 0–8)
GLUCOSE BLDC GLUCOMTR-MCNC: 151 MG/DL — HIGH (ref 70–99)
GLUCOSE BLDC GLUCOMTR-MCNC: 151 MG/DL — HIGH (ref 70–99)
GLUCOSE BLDC GLUCOMTR-MCNC: 200 MG/DL — HIGH (ref 70–99)
GLUCOSE BLDC GLUCOMTR-MCNC: 220 MG/DL — HIGH (ref 70–99)
GLUCOSE SERPL-MCNC: 210 MG/DL — HIGH (ref 70–99)
HCT VFR BLD CALC: 32.8 % — LOW (ref 37–47)
HGB BLD-MCNC: 11 G/DL — LOW (ref 12–16)
IMM GRANULOCYTES NFR BLD AUTO: 0.2 % — SIGNIFICANT CHANGE UP (ref 0.1–0.3)
LYMPHOCYTES # BLD AUTO: 38.5 % — SIGNIFICANT CHANGE UP (ref 20.5–51.1)
MCHC RBC-ENTMCNC: 27.7 PG — SIGNIFICANT CHANGE UP (ref 27–31)
MCHC RBC-ENTMCNC: 33.5 G/DL — SIGNIFICANT CHANGE UP (ref 32–37)
MCV RBC AUTO: 82.6 FL — SIGNIFICANT CHANGE UP (ref 81–99)
MONOCYTES # BLD AUTO: 0.53 K/UL — SIGNIFICANT CHANGE UP (ref 0.1–0.6)
MONOCYTES NFR BLD AUTO: 10.2 % — HIGH (ref 1.7–9.3)
NEUTROPHILS # BLD AUTO: 2.35 K/UL — SIGNIFICANT CHANGE UP (ref 1.4–6.5)
NEUTROPHILS NFR BLD AUTO: 45.3 % — SIGNIFICANT CHANGE UP (ref 42.2–75.2)
NRBC BLD AUTO-RTO: 0 /100 WBCS — SIGNIFICANT CHANGE UP (ref 0–0)
PLATELET # BLD AUTO: 216 K/UL — SIGNIFICANT CHANGE UP (ref 130–400)
PMV BLD: 9.9 FL — SIGNIFICANT CHANGE UP (ref 7.4–10.4)
POTASSIUM SERPL-MCNC: 4.2 MMOL/L — SIGNIFICANT CHANGE UP (ref 3.5–5)
POTASSIUM SERPL-SCNC: 4.2 MMOL/L — SIGNIFICANT CHANGE UP (ref 3.5–5)
PROT SERPL-MCNC: 7.2 G/DL — SIGNIFICANT CHANGE UP (ref 6–8)
RBC # FLD: 14.1 % — SIGNIFICANT CHANGE UP (ref 11.5–14.5)
SODIUM SERPL-SCNC: 136 MMOL/L — SIGNIFICANT CHANGE UP (ref 135–146)
SPECIMEN SOURCE: SIGNIFICANT CHANGE UP
WBC # BLD: 5.19 K/UL — SIGNIFICANT CHANGE UP (ref 4.8–10.8)
WBC # FLD AUTO: 5.19 K/UL — SIGNIFICANT CHANGE UP (ref 4.8–10.8)

## 2025-03-13 PROCEDURE — 99232 SBSQ HOSP IP/OBS MODERATE 35: CPT

## 2025-03-13 RX ADMIN — GABAPENTIN 300 MILLIGRAM(S): 400 CAPSULE ORAL at 05:04

## 2025-03-13 RX ADMIN — GABAPENTIN 300 MILLIGRAM(S): 400 CAPSULE ORAL at 14:53

## 2025-03-13 RX ADMIN — INSULIN LISPRO 2: 100 INJECTION, SOLUTION INTRAVENOUS; SUBCUTANEOUS at 17:19

## 2025-03-13 RX ADMIN — REMDESIVIR 200 MILLIGRAM(S): 5 INJECTION INTRAVENOUS at 11:31

## 2025-03-13 RX ADMIN — INSULIN LISPRO 1: 100 INJECTION, SOLUTION INTRAVENOUS; SUBCUTANEOUS at 11:43

## 2025-03-13 RX ADMIN — GABAPENTIN 300 MILLIGRAM(S): 400 CAPSULE ORAL at 21:01

## 2025-03-13 RX ADMIN — INSULIN LISPRO 1: 100 INJECTION, SOLUTION INTRAVENOUS; SUBCUTANEOUS at 08:05

## 2025-03-13 RX ADMIN — Medication 1 APPLICATION(S): at 05:05

## 2025-03-13 RX ADMIN — OXYBUTYNIN CHLORIDE 5 MILLIGRAM(S): 5 TABLET, FILM COATED, EXTENDED RELEASE ORAL at 11:32

## 2025-03-14 ENCOUNTER — TRANSCRIPTION ENCOUNTER (OUTPATIENT)
Age: 80
End: 2025-03-14

## 2025-03-14 LAB
ALBUMIN SERPL ELPH-MCNC: 3.9 G/DL — SIGNIFICANT CHANGE UP (ref 3.5–5.2)
ALBUMIN SERPL ELPH-MCNC: 4 G/DL — SIGNIFICANT CHANGE UP (ref 3.5–5.2)
ALP SERPL-CCNC: 78 U/L — SIGNIFICANT CHANGE UP (ref 30–115)
ALP SERPL-CCNC: 82 U/L — SIGNIFICANT CHANGE UP (ref 30–115)
ALT FLD-CCNC: 19 U/L — SIGNIFICANT CHANGE UP (ref 0–41)
ALT FLD-CCNC: 19 U/L — SIGNIFICANT CHANGE UP (ref 0–41)
ANION GAP SERPL CALC-SCNC: 12 MMOL/L — SIGNIFICANT CHANGE UP (ref 7–14)
AST SERPL-CCNC: 19 U/L — SIGNIFICANT CHANGE UP (ref 0–41)
AST SERPL-CCNC: 20 U/L — SIGNIFICANT CHANGE UP (ref 0–41)
BILIRUB DIRECT SERPL-MCNC: <0.2 MG/DL — SIGNIFICANT CHANGE UP (ref 0–0.3)
BILIRUB INDIRECT FLD-MCNC: >0.1 MG/DL — LOW (ref 0.2–1.2)
BILIRUB SERPL-MCNC: 0.3 MG/DL — SIGNIFICANT CHANGE UP (ref 0.2–1.2)
BILIRUB SERPL-MCNC: 0.3 MG/DL — SIGNIFICANT CHANGE UP (ref 0.2–1.2)
BUN SERPL-MCNC: 19 MG/DL — SIGNIFICANT CHANGE UP (ref 10–20)
CALCIUM SERPL-MCNC: 9.5 MG/DL — SIGNIFICANT CHANGE UP (ref 8.4–10.5)
CHLORIDE SERPL-SCNC: 103 MMOL/L — SIGNIFICANT CHANGE UP (ref 98–110)
CO2 SERPL-SCNC: 22 MMOL/L — SIGNIFICANT CHANGE UP (ref 17–32)
CREAT SERPL-MCNC: 0.9 MG/DL — SIGNIFICANT CHANGE UP (ref 0.7–1.5)
CREAT SERPL-MCNC: 1 MG/DL — SIGNIFICANT CHANGE UP (ref 0.7–1.5)
EGFR: 58 ML/MIN/1.73M2 — LOW
EGFR: 58 ML/MIN/1.73M2 — LOW
EGFR: 65 ML/MIN/1.73M2 — SIGNIFICANT CHANGE UP
EGFR: 65 ML/MIN/1.73M2 — SIGNIFICANT CHANGE UP
GLUCOSE BLDC GLUCOMTR-MCNC: 142 MG/DL — HIGH (ref 70–99)
GLUCOSE BLDC GLUCOMTR-MCNC: 161 MG/DL — HIGH (ref 70–99)
GLUCOSE BLDC GLUCOMTR-MCNC: 402 MG/DL — HIGH (ref 70–99)
GLUCOSE SERPL-MCNC: 153 MG/DL — HIGH (ref 70–99)
POTASSIUM SERPL-MCNC: 3.8 MMOL/L — SIGNIFICANT CHANGE UP (ref 3.5–5)
POTASSIUM SERPL-SCNC: 3.8 MMOL/L — SIGNIFICANT CHANGE UP (ref 3.5–5)
PROT SERPL-MCNC: 6.9 G/DL — SIGNIFICANT CHANGE UP (ref 6–8)
PROT SERPL-MCNC: 7.1 G/DL — SIGNIFICANT CHANGE UP (ref 6–8)
SODIUM SERPL-SCNC: 137 MMOL/L — SIGNIFICANT CHANGE UP (ref 135–146)

## 2025-03-14 PROCEDURE — 99239 HOSP IP/OBS DSCHRG MGMT >30: CPT

## 2025-03-14 RX ADMIN — GABAPENTIN 300 MILLIGRAM(S): 400 CAPSULE ORAL at 05:03

## 2025-03-14 RX ADMIN — Medication 1 APPLICATION(S): at 05:03

## 2025-03-14 RX ADMIN — GABAPENTIN 300 MILLIGRAM(S): 400 CAPSULE ORAL at 14:08

## 2025-03-14 RX ADMIN — OXYBUTYNIN CHLORIDE 5 MILLIGRAM(S): 5 TABLET, FILM COATED, EXTENDED RELEASE ORAL at 11:57

## 2025-03-14 RX ADMIN — INSULIN LISPRO 1: 100 INJECTION, SOLUTION INTRAVENOUS; SUBCUTANEOUS at 16:36

## 2025-03-14 RX ADMIN — INSULIN LISPRO 6: 100 INJECTION, SOLUTION INTRAVENOUS; SUBCUTANEOUS at 07:56

## 2025-03-14 RX ADMIN — GABAPENTIN 300 MILLIGRAM(S): 400 CAPSULE ORAL at 21:09

## 2025-03-14 NOTE — DISCHARGE NOTE PROVIDER - NSDCCPCAREPLAN_GEN_ALL_CORE_FT
PRINCIPAL DISCHARGE DIAGNOSIS  Diagnosis: 2019 novel coronavirus disease (COVID-19)  Assessment and Plan of Treatment: Completed 3 day course of IV remdesivir  You did not require oxygen while in the hospital      SECONDARY DISCHARGE DIAGNOSES  Diagnosis: Metabolic encephalopathy  Assessment and Plan of Treatment: Likely secondary to COVID-19   Saturating well on RA   CT Head: No evidence of acute intracranial pathology.  2.  Focal hypodensity within the right head of caudate, may reflect age indeterminate lacunar infarct.  3.  Sinus mucosal disease most notable in the ethmoid sinuses.  - MR brain: No acute infarct, acute intracranial hemorrhage, or mass effect.  -UA and UCX negative, Ammonia and TSH WNL, Blood cx negative for 48hrs   Now more alert and back to basline

## 2025-03-14 NOTE — DISCHARGE NOTE NURSING/CASE MANAGEMENT/SOCIAL WORK - FINANCIAL ASSISTANCE
[facility] provides services at a reduced cost to those who are determined to be eligible through [facility]’s financial assistance program. Information regarding [facility]’s financial assistance program can be found by going to [email or web address] or by calling [phone].

## 2025-03-14 NOTE — DISCHARGE NOTE PROVIDER - ATTENDING DISCHARGE PHYSICAL EXAMINATION:
GENERAL: No acute distress, well-developed  HEAD:  Atraumatic, Normocephalic  EYES: , conjunctiva and sclera clear  NECK: Supple,  no JVD  CHEST/LUNG: CTAB; No wheezes, rales,   HEART: Regular rate and rhythm; No murmurs  ABDOMEN: Soft, non-tender, non-distended; normal bowel sounds,  EXTREMITIES:  No clubbing, cyanosis, or edema  NEUROLOGY: A&O x 3, no focal deficits, more alert today   SKIN: No rashes or lesions

## 2025-03-14 NOTE — DIETITIAN INITIAL EVALUATION ADULT - PERTINENT LABORATORY DATA
03-14    137  |  103  |  19  ----------------------------<  153[H]  3.8   |  22  |  0.9    Ca    9.5      14 Mar 2025 07:17    TPro  7.1  /  Alb  4.0  /  TBili  0.3  /  DBili  <0.2  /  AST  19  /  ALT  19  /  AlkPhos  82  03-14  POCT Blood Glucose.: 161 mg/dL (03-14-25 @ 16:24)  A1C with Estimated Average Glucose Result: 9.1 % (03-12-25 @ 06:52)  A1C with Estimated Average Glucose Result: 8.7 % (10-28-24 @ 10:29)   03-14    137  |  103  |  19  ----------------------------<  153[H]  3.8   |  22  |  0.9    Ca    9.5      14 Mar 2025 07:17    TPro  7.1  /  Alb  4.0  /  TBili  0.3  /  DBili  <0.2  /  AST  19  /  ALT  19  /  AlkPhos  82  03-14  POCT Blood Glucose.: 161 mg/dL (03-14-25 @ 16:24)  A1C with Estimated Average Glucose Result: 9.1 % (03-12-25 @ 06:52)  A1C with Estimated Average Glucose Result: 8.7 % (10-28-24 @ 10:29)                          11.0   5.19  )-----------( 216      ( 13 Mar 2025 07:35 )             32.8

## 2025-03-14 NOTE — DISCHARGE NOTE PROVIDER - CARE PROVIDER_API CALL
Kiki Carlson  Family Medicine  [facility] [address]  Phone: [phone]  Fax: [phone]  Follow Up Time:

## 2025-03-14 NOTE — DIETITIAN INITIAL EVALUATION ADULT - OTHER INFO
pt is a 80 year old female with hx of HTN, R TKR in October of 2024, OA, DM, neuropathy, gerd p/w AMS, recently admitted to [facility] for sepsis. pt admitted with AMS , metabolic encephalopathy 2/2 COVID. pt is medically discharged however discharge is presently on hold until guardian can be reached.

## 2025-03-14 NOTE — DISCHARGE NOTE PROVIDER - HOSPITAL COURSE
81 yo female w PMHx as reviewed in the EMR who presented to the ED w her daughter w c/o increasing AMS confusion and forgetfulness     Metabolic Encephalopathy  Likely secondary to COVID-19   Saturating well on RA   CT Head: No evidence of acute intracranial pathology.  2.  Focal hypodensity within the right head of caudate, may reflect age indeterminate lacunar infarct.  3.  Sinus mucosal disease most notable in the ethmoid sinuses.  - MR brain: No acute infarct, acute intracranial hemorrhage, or mass effect.  -UA and UCX negative, Ammonia and TSH WNL, F/up Blood cx   -ID consult appreciated>>Continue RDV and isolation precautions   -Patient is more alert and back to baseline

## 2025-03-14 NOTE — DISCHARGE NOTE PROVIDER - NSDCMRMEDTOKEN_GEN_ALL_CORE_FT
ferrous sulfate 325 mg (65 mg elemental iron) oral delayed release tablet: 1 tab(s) orally once a day  gabapentin 300 mg oral tablet: 1 cap(s) orally 3 times a day  oxyBUTYnin 5 mg oral tablet: 1 tab(s) orally once a day (at bedtime)  pantoprazole 40 mg oral delayed release tablet: 1 tab(s) orally every 12 hours as needed for  heartburn

## 2025-03-14 NOTE — DIETITIAN INITIAL EVALUATION ADULT - DATE OF WEIGHT PRIOR TO ADM
I have reviewed discharge instructions with the patient. The patient verbalized understanding. Patient left ED via Discharge Method: ambulatory to Home with (insert name of family/friend, self, transport self). Opportunity for questions and clarification provided. Patient given 1 scripts. To continue your aftercare when you leave the hospital, you may receive an automated call from our care team to check in on how you are doing. This is a free service and part of our promise to provide the best care and service to meet your aftercare needs.  If you have questions, or wish to unsubscribe from this service please call [phone]. Thank you for Choosing our [facility].         Mat Knapp RN  11/21/22 [id number] 29-Oct-2024

## 2025-03-14 NOTE — DIETITIAN INITIAL EVALUATION ADULT - PERTINENT MEDS FT
MEDICATIONS  (STANDING):  chlorhexidine 2% Cloths 1 Application(s) Topical <User Schedule>  dextrose 5%. 1000 milliLiter(s) (50 mL/Hr) IV Continuous <Continuous>  dextrose 5%. 1000 milliLiter(s) (100 mL/Hr) IV Continuous <Continuous>  dextrose 50% Injectable 25 Gram(s) IV Push once  dextrose 50% Injectable 12.5 Gram(s) IV Push once  dextrose 50% Injectable 25 Gram(s) IV Push once  gabapentin 300 milliGRAM(s) Oral three times a day  glucagon  Injectable 1 milliGRAM(s) IntraMuscular once  insulin lispro (ADMELOG) corrective regimen sliding scale   SubCutaneous three times a day before meals  insulin lispro (ADMELOG) corrective regimen sliding scale   SubCutaneous at bedtime  oxybutynin 5 milliGRAM(s) Oral daily    MEDICATIONS  (PRN):  acetaminophen     Tablet .. 650 milliGRAM(s) Oral every 6 hours PRN Temp greater or equal to 38C (100.4F), Mild Pain (1 - 3)  aluminum hydroxide/magnesium hydroxide/simethicone Suspension 30 milliLiter(s) Oral every 4 hours PRN Dyspepsia  dextrose Oral Gel 15 Gram(s) Oral once PRN Blood Glucose LESS THAN 70 milliGRAM(s)/deciliter  melatonin 3 milliGRAM(s) Oral at bedtime PRN Insomnia  ondansetron Injectable 4 milliGRAM(s) IV Push every 8 hours PRN Nausea and/or Vomiting  pantoprazole    Tablet 40 milliGRAM(s) Oral before breakfast PRN for heartburn   MEDICATIONS  (STANDING):  chlorhexidine 2% Cloths 1 Application(s) Topical <User Schedule>  dextrose 5%. 1000 milliLiter(s) (50 mL/Hr) IV Continuous <Continuous>  dextrose 5%. 1000 milliLiter(s) (100 mL/Hr) IV Continuous <Continuous>  gabapentin 300 milliGRAM(s) Oral three times a day  glucagon  Injectable 1 milliGRAM(s) IntraMuscular once  insulin lispro (ADMELOG) corrective regimen sliding scale   SubCutaneous three times a day before meals  insulin lispro (ADMELOG) corrective regimen sliding scale   SubCutaneous at bedtime  oxybutynin 5 milliGRAM(s) Oral daily    MEDICATIONS  (PRN):  acetaminophen     Tablet .. 650 milliGRAM(s) Oral every 6 hours PRN Temp greater or equal to 38C (100.4F), Mild Pain (1 - 3)  aluminum hydroxide/magnesium hydroxide/simethicone Suspension 30 milliLiter(s) Oral every 4 hours PRN Dyspepsia  dextrose Oral Gel 15 Gram(s) Oral once PRN Blood Glucose LESS THAN 70 milliGRAM(s)/deciliter  melatonin 3 milliGRAM(s) Oral at bedtime PRN Insomnia  ondansetron Injectable 4 milliGRAM(s) IV Push every 8 hours PRN Nausea and/or Vomiting  pantoprazole    Tablet 40 milliGRAM(s) Oral before breakfast PRN for heartburn

## 2025-03-14 NOTE — DIETITIAN INITIAL EVALUATION ADULT - ORAL INTAKE PTA/DIET HISTORY
unable to obtain, pt is confused. Unsuccessful attempt at contacting family. As per EMR Review pt with 21# weight loss since admission in October for R TKR.       pt is presently on a CHO Consistent DASH diet tolerating well 50% of meals consumed unable to obtain, pt is confused. Unsuccessful attempt at contacting family. As per EMR Review pt with 21# weight loss since admission in October for R TKR.       pt is presently on a CHO Consistent DASH diet tolerating well 50-75% of meals consumed

## 2025-03-14 NOTE — DISCHARGE NOTE NURSING/CASE MANAGEMENT/SOCIAL WORK - PATIENT PORTAL LINK FT
You can access the FollowMyHealth Patient Portal offered by [facility] by registering at the following website: [email or web address]. By joining [employer]’s FollowMyHealth portal, you will also be able to view your health information using other applications (apps) compatible with our system.

## 2025-03-14 NOTE — DIETITIAN INITIAL EVALUATION ADULT - NS FNS DIET ORDER
Diet, Regular:   Consistent Carbohydrate {No Snacks} (CSTCHO)  DASH/TLC {Sodium & Cholesterol Restricted} (DASH) (03-12-25 @ 00:09) [Active]

## 2025-03-14 NOTE — DISCHARGE NOTE PROVIDER - DETAILS OF MALNUTRITION DIAGNOSIS/DIAGNOSES
This patient has been assessed with a concern for Malnutrition and was treated during this hospitalization for the following Nutrition diagnosis/diagnoses:     -  03/14/2025: Moderate protein-calorie malnutrition

## 2025-03-15 LAB
ALBUMIN SERPL ELPH-MCNC: 3.9 G/DL — SIGNIFICANT CHANGE UP (ref 3.5–5.2)
ALP SERPL-CCNC: 82 U/L — SIGNIFICANT CHANGE UP (ref 30–115)
ALT FLD-CCNC: 16 U/L — SIGNIFICANT CHANGE UP (ref 0–41)
AST SERPL-CCNC: 15 U/L — SIGNIFICANT CHANGE UP (ref 0–41)
BILIRUB DIRECT SERPL-MCNC: <0.2 MG/DL — SIGNIFICANT CHANGE UP (ref 0–0.3)
BILIRUB SERPL-MCNC: <0.2 MG/DL — SIGNIFICANT CHANGE UP (ref 0.2–1.2)
CREAT SERPL-MCNC: 1 MG/DL — SIGNIFICANT CHANGE UP (ref 0.7–1.5)
EGFR: 58 ML/MIN/1.73M2 — LOW
EGFR: 58 ML/MIN/1.73M2 — LOW
GLUCOSE BLDC GLUCOMTR-MCNC: 155 MG/DL — HIGH (ref 70–99)
GLUCOSE BLDC GLUCOMTR-MCNC: 188 MG/DL — HIGH (ref 70–99)
GLUCOSE BLDC GLUCOMTR-MCNC: 193 MG/DL — HIGH (ref 70–99)
GLUCOSE BLDC GLUCOMTR-MCNC: 223 MG/DL — HIGH (ref 70–99)
PROT SERPL-MCNC: 6.9 G/DL — SIGNIFICANT CHANGE UP (ref 6–8)

## 2025-03-15 PROCEDURE — 99232 SBSQ HOSP IP/OBS MODERATE 35: CPT

## 2025-03-15 RX ADMIN — INSULIN LISPRO 1: 100 INJECTION, SOLUTION INTRAVENOUS; SUBCUTANEOUS at 11:32

## 2025-03-15 RX ADMIN — GABAPENTIN 300 MILLIGRAM(S): 400 CAPSULE ORAL at 05:17

## 2025-03-15 RX ADMIN — INSULIN LISPRO 1: 100 INJECTION, SOLUTION INTRAVENOUS; SUBCUTANEOUS at 08:00

## 2025-03-15 RX ADMIN — INSULIN LISPRO 1: 100 INJECTION, SOLUTION INTRAVENOUS; SUBCUTANEOUS at 16:48

## 2025-03-15 RX ADMIN — Medication 1 APPLICATION(S): at 05:17

## 2025-03-15 RX ADMIN — GABAPENTIN 300 MILLIGRAM(S): 400 CAPSULE ORAL at 21:18

## 2025-03-15 RX ADMIN — GABAPENTIN 300 MILLIGRAM(S): 400 CAPSULE ORAL at 13:02

## 2025-03-15 RX ADMIN — OXYBUTYNIN CHLORIDE 5 MILLIGRAM(S): 5 TABLET, FILM COATED, EXTENDED RELEASE ORAL at 11:33

## 2025-03-16 LAB
ALBUMIN SERPL ELPH-MCNC: 3.9 G/DL — SIGNIFICANT CHANGE UP (ref 3.5–5.2)
ALP SERPL-CCNC: 83 U/L — SIGNIFICANT CHANGE UP (ref 30–115)
ALT FLD-CCNC: 13 U/L — SIGNIFICANT CHANGE UP (ref 0–41)
AST SERPL-CCNC: 13 U/L — SIGNIFICANT CHANGE UP (ref 0–41)
BILIRUB DIRECT SERPL-MCNC: <0.2 MG/DL — SIGNIFICANT CHANGE UP (ref 0–0.3)
BILIRUB INDIRECT FLD-MCNC: SIGNIFICANT CHANGE UP MG/DL (ref 0.2–1.2)
BILIRUB SERPL-MCNC: <0.2 MG/DL — SIGNIFICANT CHANGE UP (ref 0.2–1.2)
CREAT SERPL-MCNC: 1.1 MG/DL — SIGNIFICANT CHANGE UP (ref 0.7–1.5)
EGFR: 51 ML/MIN/1.73M2 — LOW
EGFR: 51 ML/MIN/1.73M2 — LOW
GLUCOSE BLDC GLUCOMTR-MCNC: 168 MG/DL — HIGH (ref 70–99)
GLUCOSE BLDC GLUCOMTR-MCNC: 207 MG/DL — HIGH (ref 70–99)
GLUCOSE BLDC GLUCOMTR-MCNC: 243 MG/DL — HIGH (ref 70–99)
GLUCOSE BLDC GLUCOMTR-MCNC: 287 MG/DL — HIGH (ref 70–99)
PROT SERPL-MCNC: 6.8 G/DL — SIGNIFICANT CHANGE UP (ref 6–8)

## 2025-03-16 PROCEDURE — 99231 SBSQ HOSP IP/OBS SF/LOW 25: CPT

## 2025-03-16 RX ADMIN — OXYBUTYNIN CHLORIDE 5 MILLIGRAM(S): 5 TABLET, FILM COATED, EXTENDED RELEASE ORAL at 11:39

## 2025-03-16 RX ADMIN — Medication 650 MILLIGRAM(S): at 11:55

## 2025-03-16 RX ADMIN — GABAPENTIN 300 MILLIGRAM(S): 400 CAPSULE ORAL at 21:09

## 2025-03-16 RX ADMIN — INSULIN LISPRO 3: 100 INJECTION, SOLUTION INTRAVENOUS; SUBCUTANEOUS at 11:40

## 2025-03-16 RX ADMIN — GABAPENTIN 300 MILLIGRAM(S): 400 CAPSULE ORAL at 13:06

## 2025-03-16 RX ADMIN — GABAPENTIN 300 MILLIGRAM(S): 400 CAPSULE ORAL at 05:19

## 2025-03-16 RX ADMIN — INSULIN LISPRO 1: 100 INJECTION, SOLUTION INTRAVENOUS; SUBCUTANEOUS at 16:49

## 2025-03-16 RX ADMIN — Medication 650 MILLIGRAM(S): at 12:55

## 2025-03-16 RX ADMIN — Medication 1 APPLICATION(S): at 05:19

## 2025-03-16 RX ADMIN — INSULIN LISPRO 2: 100 INJECTION, SOLUTION INTRAVENOUS; SUBCUTANEOUS at 08:42

## 2025-03-17 VITALS
RESPIRATION RATE: 18 BRPM | DIASTOLIC BLOOD PRESSURE: 61 MMHG | OXYGEN SATURATION: 100 % | TEMPERATURE: 98 F | SYSTOLIC BLOOD PRESSURE: 132 MMHG | HEART RATE: 74 BPM

## 2025-03-17 LAB
ALBUMIN SERPL ELPH-MCNC: 4 G/DL — SIGNIFICANT CHANGE UP (ref 3.5–5.2)
ALP SERPL-CCNC: 81 U/L — SIGNIFICANT CHANGE UP (ref 30–115)
ALT FLD-CCNC: 12 U/L — SIGNIFICANT CHANGE UP (ref 0–41)
AST SERPL-CCNC: 12 U/L — SIGNIFICANT CHANGE UP (ref 0–41)
BILIRUB DIRECT SERPL-MCNC: <0.2 MG/DL — SIGNIFICANT CHANGE UP (ref 0–0.3)
BILIRUB INDIRECT FLD-MCNC: SIGNIFICANT CHANGE UP MG/DL (ref 0.2–1.2)
BILIRUB SERPL-MCNC: <0.2 MG/DL — SIGNIFICANT CHANGE UP (ref 0.2–1.2)
CREAT SERPL-MCNC: 1.1 MG/DL — SIGNIFICANT CHANGE UP (ref 0.7–1.5)
CULTURE RESULTS: SIGNIFICANT CHANGE UP
CULTURE RESULTS: SIGNIFICANT CHANGE UP
EGFR: 51 ML/MIN/1.73M2 — LOW
EGFR: 51 ML/MIN/1.73M2 — LOW
PROT SERPL-MCNC: 7 G/DL — SIGNIFICANT CHANGE UP (ref 6–8)
SPECIMEN SOURCE: SIGNIFICANT CHANGE UP
SPECIMEN SOURCE: SIGNIFICANT CHANGE UP

## 2025-03-17 PROCEDURE — 99231 SBSQ HOSP IP/OBS SF/LOW 25: CPT

## 2025-03-17 RX ORDER — AMLODIPINE BESYLATE 10 MG/1
5 TABLET ORAL ONCE
Refills: 0 | Status: COMPLETED | OUTPATIENT
Start: 2025-03-17 | End: 2025-03-17

## 2025-03-17 RX ADMIN — OXYBUTYNIN CHLORIDE 5 MILLIGRAM(S): 5 TABLET, FILM COATED, EXTENDED RELEASE ORAL at 11:09

## 2025-03-17 RX ADMIN — INSULIN LISPRO 2: 100 INJECTION, SOLUTION INTRAVENOUS; SUBCUTANEOUS at 11:56

## 2025-03-17 RX ADMIN — GABAPENTIN 300 MILLIGRAM(S): 400 CAPSULE ORAL at 05:49

## 2025-03-17 RX ADMIN — Medication 40 MILLIGRAM(S): at 00:06

## 2025-03-17 RX ADMIN — INSULIN LISPRO 1: 100 INJECTION, SOLUTION INTRAVENOUS; SUBCUTANEOUS at 07:54

## 2025-03-17 RX ADMIN — GABAPENTIN 300 MILLIGRAM(S): 400 CAPSULE ORAL at 13:47

## 2025-03-17 RX ADMIN — AMLODIPINE BESYLATE 5 MILLIGRAM(S): 10 TABLET ORAL at 04:43

## 2025-03-17 RX ADMIN — Medication 30 MILLILITER(S): at 01:20

## 2025-03-17 RX ADMIN — Medication 1 APPLICATION(S): at 05:49

## 2025-03-17 NOTE — PROGRESS NOTE ADULT - SUBJECTIVE AND OBJECTIVE BOX
Patient is a 80y old  Female who presents with a chief complaint of AMS (12 Mar 2025 10:02)        MEDICATIONS  (STANDING):  chlorhexidine 2% Cloths 1 Application(s) Topical <User Schedule>  dextrose 5%. 1000 milliLiter(s) (50 mL/Hr) IV Continuous <Continuous>  dextrose 5%. 1000 milliLiter(s) (100 mL/Hr) IV Continuous <Continuous>  dextrose 50% Injectable 25 Gram(s) IV Push once  dextrose 50% Injectable 12.5 Gram(s) IV Push once  dextrose 50% Injectable 25 Gram(s) IV Push once  gabapentin 300 milliGRAM(s) Oral three times a day  glucagon  Injectable 1 milliGRAM(s) IntraMuscular once  insulin lispro (ADMELOG) corrective regimen sliding scale   SubCutaneous three times a day before meals  insulin lispro (ADMELOG) corrective regimen sliding scale   SubCutaneous at bedtime  oxybutynin 5 milliGRAM(s) Oral daily  remdesivir  IVPB   IV Intermittent     MEDICATIONS  (PRN):  acetaminophen     Tablet .. 650 milliGRAM(s) Oral every 6 hours PRN Temp greater or equal to 38C (100.4F), Mild Pain (1 - 3)  aluminum hydroxide/magnesium hydroxide/simethicone Suspension 30 milliLiter(s) Oral every 4 hours PRN Dyspepsia  dextrose Oral Gel 15 Gram(s) Oral once PRN Blood Glucose LESS THAN 70 milliGRAM(s)/deciliter  melatonin 3 milliGRAM(s) Oral at bedtime PRN Insomnia  ondansetron Injectable 4 milliGRAM(s) IV Push every 8 hours PRN Nausea and/or Vomiting  pantoprazole    Tablet 40 milliGRAM(s) Oral before breakfast PRN for heartburn      CAPILLARY BLOOD GLUCOSE  POCT Blood Glucose.: 109 mg/dL (12 Mar 2025 12:03)  POCT Blood Glucose.: 99 mg/dL (12 Mar 2025 07:35)  POCT Blood Glucose.: 123 mg/dL (12 Mar 2025 00:32)  POCT Blood Glucose.: 212 mg/dL (11 Mar 2025 21:55)    I&O's Summary      PHYSICAL EXAM:  Vital Signs Last 24 Hrs  T(C): 36.9 (12 Mar 2025 05:00), Max: 38.2 (11 Mar 2025 21:49)  T(F): 98.4 (12 Mar 2025 05:00), Max: 100.7 (11 Mar 2025 21:49)  HR: 66 (12 Mar 2025 05:00) (66 - 96)  BP: 147/77 (12 Mar 2025 05:00) (141/68 - 151/80)  BP(mean): --  RR: 18 (12 Mar 2025 05:00) (18 - 18)  SpO2: 100% (12 Mar 2025 05:00) (99% - 100%)    Parameters below as of 12 Mar 2025 05:00  Patient On (Oxygen Delivery Method): room air      GENERAL: No acute distress, well-developed  HEAD:  Atraumatic, Normocephalic  EYES: conjunctiva and sclera clear  NECK: Supple,  no JVD  CHEST/LUNG: CTAB; No wheezes, rales, or rhonchi  HEART: Regular rate and rhythm; No murmurs  ABDOMEN: Soft, non-tender, non-distended; normal bowel sounds  EXTREMITIES: No clubbing, cyanosis, or edema  NEUROLOGY: A&O x 1, no focal deficits  SKIN: No rashes or lesions    LABS:                        11.2   4.65  )-----------( 215      ( 12 Mar 2025 06:52 )             33.6     03-12    141  |  105  |  18  ----------------------------<  103[H]  4.3   |  24  |  1.0    Ca    9.5      12 Mar 2025 06:52  Mg     2.0     03-11    TPro  7.0  /  Alb  4.0  /  TBili  0.6  /  DBili  0.2  /  AST  29  /  ALT  22  /  AlkPhos  82  03-1      Urinalysis Basic - ( 12 Mar 2025 06:52 )    Color: x / Appearance: x / SG: x / pH: x  Gluc: 103 mg/dL / Ketone: x  / Bili: x / Urobili: x   Blood: x / Protein: x / Nitrite: x   Leuk Esterase: x / RBC: x / WBC x   Sq Epi: x / Non Sq Epi: x / Bacteria: x        Urinalysis with Rflx Culture (collected 11 Mar 2025 22:45)          
Patient is a 80y old  Female who presents with a chief complaint of AMS (12 Mar 2025 13:32)      MEDICATIONS  (STANDING):  chlorhexidine 2% Cloths 1 Application(s) Topical <User Schedule>  dextrose 5%. 1000 milliLiter(s) (50 mL/Hr) IV Continuous <Continuous>  dextrose 5%. 1000 milliLiter(s) (100 mL/Hr) IV Continuous <Continuous>  dextrose 50% Injectable 25 Gram(s) IV Push once  dextrose 50% Injectable 12.5 Gram(s) IV Push once  dextrose 50% Injectable 25 Gram(s) IV Push once  gabapentin 300 milliGRAM(s) Oral three times a day  glucagon  Injectable 1 milliGRAM(s) IntraMuscular once  insulin lispro (ADMELOG) corrective regimen sliding scale   SubCutaneous three times a day before meals  insulin lispro (ADMELOG) corrective regimen sliding scale   SubCutaneous at bedtime  oxybutynin 5 milliGRAM(s) Oral daily  remdesivir  IVPB 100 milliGRAM(s) IV Intermittent every 24 hours  remdesivir  IVPB   IV Intermittent     MEDICATIONS  (PRN):  acetaminophen     Tablet .. 650 milliGRAM(s) Oral every 6 hours PRN Temp greater or equal to 38C (100.4F), Mild Pain (1 - 3)  aluminum hydroxide/magnesium hydroxide/simethicone Suspension 30 milliLiter(s) Oral every 4 hours PRN Dyspepsia  dextrose Oral Gel 15 Gram(s) Oral once PRN Blood Glucose LESS THAN 70 milliGRAM(s)/deciliter  melatonin 3 milliGRAM(s) Oral at bedtime PRN Insomnia  ondansetron Injectable 4 milliGRAM(s) IV Push every 8 hours PRN Nausea and/or Vomiting  pantoprazole    Tablet 40 milliGRAM(s) Oral before breakfast PRN for heartburn      CAPILLARY BLOOD GLUCOSE  POCT Blood Glucose.: 151 mg/dL (13 Mar 2025 11:35)  POCT Blood Glucose.: 200 mg/dL (13 Mar 2025 07:43)  POCT Blood Glucose.: 134 mg/dL (12 Mar 2025 20:55)  POCT Blood Glucose.: 157 mg/dL (12 Mar 2025 16:23)    I&O's Summary      PHYSICAL EXAM:  Vital Signs Last 24 Hrs  T(C): 36.7 (13 Mar 2025 04:40), Max: 36.7 (12 Mar 2025 14:16)  T(F): 98.1 (13 Mar 2025 04:40), Max: 98.1 (13 Mar 2025 04:40)  HR: 77 (13 Mar 2025 04:40) (68 - 81)  BP: 152/65 (13 Mar 2025 04:40) (135/80 - 178/82)  BP(mean): --  RR: 18 (13 Mar 2025 04:40) (18 - 18)  SpO2: 100% (13 Mar 2025 04:40) (100% - 100%)    Parameters below as of 13 Mar 2025 04:40  Patient On (Oxygen Delivery Method): room air        GENERAL: No acute distress, well-developed  HEAD:  Atraumatic, Normocephalic  EYES: , conjunctiva and sclera clear  NECK: Supple,  no JVD  CHEST/LUNG: CTAB; No wheezes, rales,   HEART: Regular rate and rhythm; No murmurs  ABDOMEN: Soft, non-tender, non-distended; normal bowel sounds,  EXTREMITIES:  No clubbing, cyanosis, or edema  NEUROLOGY: A&O x 3, no focal deficits, more alert today   SKIN: No rashes or lesions    LABS:                        11.0   5.19  )-----------( 216      ( 13 Mar 2025 07:35 )             32.8     03-13    136  |  103  |  23[H]  ----------------------------<  210[H]  4.2   |  20  |  1.0    Ca    9.4      13 Mar 2025 07:35  Mg     2.0     03-11    TPro  7.2  /  Alb  3.9  /  TBili  <0.2  /  DBili  <0.2  /  AST  29  /  ALT  22  /  AlkPhos  79  03-13      Urinalysis Basic - ( 13 Mar 2025 07:35 )    Color: x / Appearance: x / SG: x / pH: x  Gluc: 210 mg/dL / Ketone: x  / Bili: x / Urobili: x   Blood: x / Protein: x / Nitrite: x   Leuk Esterase: x / RBC: x / WBC x   Sq Epi: x / Non Sq Epi: x / Bacteria: x      Culture - Urine (collected 11 Mar 2025 22:45)  Source: Clean Catch None  Final Report (13 Mar 2025 13:09):    No growth    Urinalysis with Rflx Culture (collected 11 Mar 2025 22:45)    
Patient is a 80y old  Female who presents with a chief complaint of AMS (16 Mar 2025 11:47)        MEDICATIONS  (STANDING):  chlorhexidine 2% Cloths 1 Application(s) Topical <User Schedule>  dextrose 5%. 1000 milliLiter(s) (50 mL/Hr) IV Continuous <Continuous>  dextrose 5%. 1000 milliLiter(s) (100 mL/Hr) IV Continuous <Continuous>  dextrose 50% Injectable 25 Gram(s) IV Push once  dextrose 50% Injectable 12.5 Gram(s) IV Push once  dextrose 50% Injectable 25 Gram(s) IV Push once  gabapentin 300 milliGRAM(s) Oral three times a day  glucagon  Injectable 1 milliGRAM(s) IntraMuscular once  insulin lispro (ADMELOG) corrective regimen sliding scale   SubCutaneous three times a day before meals  insulin lispro (ADMELOG) corrective regimen sliding scale   SubCutaneous at bedtime  oxybutynin 5 milliGRAM(s) Oral daily    MEDICATIONS  (PRN):  acetaminophen     Tablet .. 650 milliGRAM(s) Oral every 6 hours PRN Temp greater or equal to 38C (100.4F), Mild Pain (1 - 3)  aluminum hydroxide/magnesium hydroxide/simethicone Suspension 30 milliLiter(s) Oral every 4 hours PRN Dyspepsia  dextrose Oral Gel 15 Gram(s) Oral once PRN Blood Glucose LESS THAN 70 milliGRAM(s)/deciliter  melatonin 3 milliGRAM(s) Oral at bedtime PRN Insomnia  ondansetron Injectable 4 milliGRAM(s) IV Push every 8 hours PRN Nausea and/or Vomiting  pantoprazole    Tablet 40 milliGRAM(s) Oral before breakfast PRN for heartburn      CAPILLARY BLOOD GLUCOSE  POCT Blood Glucose.: 243 mg/dL (17 Mar 2025 11:27)  POCT Blood Glucose.: 175 mg/dL (17 Mar 2025 07:18)  POCT Blood Glucose.: 243 mg/dL (16 Mar 2025 20:36)  POCT Blood Glucose.: 168 mg/dL (16 Mar 2025 16:41)    I&O's Summary      PHYSICAL EXAM:  Vital Signs Last 24 Hrs  T(C): 36.8 (17 Mar 2025 04:32), Max: 37.2 (16 Mar 2025 20:20)  T(F): 98.3 (17 Mar 2025 04:32), Max: 98.9 (16 Mar 2025 20:20)  HR: 75 (17 Mar 2025 05:54) (75 - 90)  BP: 189/99 (17 Mar 2025 05:54) (129/68 - 210/93)  BP(mean): --  RR: 18 (17 Mar 2025 04:32) (18 - 18)  SpO2: 97% (17 Mar 2025 04:32) (97% - 99%)      GENERAL: No acute distress, well-developed  HEAD:  Atraumatic, Normocephalic  EYES: , conjunctiva and sclera clear  NECK: Supple,  no JVD  CHEST/LUNG: CTAB; No wheezes, rales,   HEART: Regular rate and rhythm; No murmurs  ABDOMEN: Soft, non-tender, non-distended; normal bowel sounds,  EXTREMITIES:  No clubbing, cyanosis, or edema  NEUROLOGY: A&O x 3, no focal deficits, remains  alert   SKIN: No rashes or lesions      LABS:    03-17    x   |  x   |  x   ----------------------------<  x   x    |  x   |  1.1      TPro  7.0  /  Alb  4.0  /  TBili  <0.2  /  DBili  <0.2  /  AST  12  /  ALT  12  /  AlkPhos  81  03-17              
Patient is a 80y old  Female who presents with a chief complaint of AMS (15 Mar 2025 08:12)      MEDICATIONS  (STANDING):  chlorhexidine 2% Cloths 1 Application(s) Topical <User Schedule>  dextrose 5%. 1000 milliLiter(s) (50 mL/Hr) IV Continuous <Continuous>  dextrose 5%. 1000 milliLiter(s) (100 mL/Hr) IV Continuous <Continuous>  dextrose 50% Injectable 25 Gram(s) IV Push once  dextrose 50% Injectable 12.5 Gram(s) IV Push once  dextrose 50% Injectable 25 Gram(s) IV Push once  gabapentin 300 milliGRAM(s) Oral three times a day  glucagon  Injectable 1 milliGRAM(s) IntraMuscular once  insulin lispro (ADMELOG) corrective regimen sliding scale   SubCutaneous three times a day before meals  insulin lispro (ADMELOG) corrective regimen sliding scale   SubCutaneous at bedtime  oxybutynin 5 milliGRAM(s) Oral daily    MEDICATIONS  (PRN):  acetaminophen     Tablet .. 650 milliGRAM(s) Oral every 6 hours PRN Temp greater or equal to 38C (100.4F), Mild Pain (1 - 3)  aluminum hydroxide/magnesium hydroxide/simethicone Suspension 30 milliLiter(s) Oral every 4 hours PRN Dyspepsia  dextrose Oral Gel 15 Gram(s) Oral once PRN Blood Glucose LESS THAN 70 milliGRAM(s)/deciliter  melatonin 3 milliGRAM(s) Oral at bedtime PRN Insomnia  ondansetron Injectable 4 milliGRAM(s) IV Push every 8 hours PRN Nausea and/or Vomiting  pantoprazole    Tablet 40 milliGRAM(s) Oral before breakfast PRN for heartburn      CAPILLARY BLOOD GLUCOSE      POCT Blood Glucose.: 287 mg/dL (16 Mar 2025 11:28)  POCT Blood Glucose.: 207 mg/dL (16 Mar 2025 07:48)  POCT Blood Glucose.: 223 mg/dL (15 Mar 2025 20:02)  POCT Blood Glucose.: 188 mg/dL (15 Mar 2025 16:36)    I&O's Summary      PHYSICAL EXAM:  Vital Signs Last 24 Hrs  T(C): 36.6 (16 Mar 2025 04:55), Max: 36.9 (15 Mar 2025 14:15)  T(F): 97.8 (16 Mar 2025 04:55), Max: 98.5 (15 Mar 2025 14:15)  HR: 78 (16 Mar 2025 04:55) (65 - 83)  BP: 139/80 (16 Mar 2025 04:55) (112/70 - 139/80)  BP(mean): --  RR: 18 (16 Mar 2025 04:55) (18 - 18)  SpO2: 99% (16 Mar 2025 04:55) (98% - 100%)    Parameters below as of 15 Mar 2025 14:15  Patient On (Oxygen Delivery Method): room air      GENERAL: No acute distress, well-developed  HEAD:  Atraumatic, Normocephalic  EYES: , conjunctiva and sclera clear  NECK: Supple,  no JVD  CHEST/LUNG: CTAB; No wheezes, rales,   HEART: Regular rate and rhythm; No murmurs  ABDOMEN: Soft, non-tender, non-distended; normal bowel sounds,  EXTREMITIES:  No clubbing, cyanosis, or edema  NEUROLOGY: A&O x 3, no focal deficits, remains  alert   SKIN: No rashes or lesions      LABS:    03-16    x   |  x   |  x   ----------------------------<  x   x    |  x   |  1.1      TPro  6.8  /  Alb  3.9  /  TBili  <0.2  /  DBili  <0.2  /  AST  13  /  ALT  13  /  AlkPhos  83  03-16            
Patient is a 80y old  Female who presents with a chief complaint of Other specified viral infection, in conditions classified elsewhere and of unspecified site      MEDICATIONS  (STANDING):  chlorhexidine 2% Cloths 1 Application(s) Topical <User Schedule>  dextrose 5%. 1000 milliLiter(s) (50 mL/Hr) IV Continuous <Continuous>  dextrose 5%. 1000 milliLiter(s) (100 mL/Hr) IV Continuous <Continuous>  dextrose 50% Injectable 25 Gram(s) IV Push once  dextrose 50% Injectable 12.5 Gram(s) IV Push once  dextrose 50% Injectable 25 Gram(s) IV Push once  gabapentin 300 milliGRAM(s) Oral three times a day  glucagon  Injectable 1 milliGRAM(s) IntraMuscular once  insulin lispro (ADMELOG) corrective regimen sliding scale   SubCutaneous three times a day before meals  insulin lispro (ADMELOG) corrective regimen sliding scale   SubCutaneous at bedtime  oxybutynin 5 milliGRAM(s) Oral daily    MEDICATIONS  (PRN):  acetaminophen     Tablet .. 650 milliGRAM(s) Oral every 6 hours PRN Temp greater or equal to 38C (100.4F), Mild Pain (1 - 3)  aluminum hydroxide/magnesium hydroxide/simethicone Suspension 30 milliLiter(s) Oral every 4 hours PRN Dyspepsia  dextrose Oral Gel 15 Gram(s) Oral once PRN Blood Glucose LESS THAN 70 milliGRAM(s)/deciliter  melatonin 3 milliGRAM(s) Oral at bedtime PRN Insomnia  ondansetron Injectable 4 milliGRAM(s) IV Push every 8 hours PRN Nausea and/or Vomiting  pantoprazole    Tablet 40 milliGRAM(s) Oral before breakfast PRN for heartburn      CAPILLARY BLOOD GLUCOSE      POCT Blood Glucose.: 155 mg/dL (15 Mar 2025 07:56)  POCT Blood Glucose.: 142 mg/dL (14 Mar 2025 21:02)  POCT Blood Glucose.: 161 mg/dL (14 Mar 2025 16:24)  POCT Blood Glucose.: 142 mg/dL (14 Mar 2025 11:33)    I&O's Summary      PHYSICAL EXAM:  Vital Signs Last 24 Hrs  T(C): 36.3 (15 Mar 2025 04:04), Max: 37.5 (14 Mar 2025 14:44)  T(F): 97.4 (15 Mar 2025 04:04), Max: 99.5 (14 Mar 2025 14:44)  HR: 63 (15 Mar 2025 04:04) (56 - 63)  BP: 143/83 (15 Mar 2025 04:04) (110/60 - 143/83)  BP(mean): --  RR: 18 (15 Mar 2025 04:04) (18 - 18)  SpO2: 99% (15 Mar 2025 04:04) (93% - 99%)    Parameters below as of 15 Mar 2025 04:04  Patient On (Oxygen Delivery Method): room air      GENERAL: No acute distress, well-developed  HEAD:  Atraumatic, Normocephalic  EYES: , conjunctiva and sclera clear  NECK: Supple,  no JVD  CHEST/LUNG: CTAB; No wheezes, rales,   HEART: Regular rate and rhythm; No murmurs  ABDOMEN: Soft, non-tender, non-distended; normal bowel sounds,  EXTREMITIES:  No clubbing, cyanosis, or edema  NEUROLOGY: A&O x 3, no focal deficits, remains  alert   SKIN: No rashes or lesions      LABS:    03-14    137  |  103  |  19  ----------------------------<  153[H]  3.8   |  22  |  0.9    Ca    9.5      14 Mar 2025 07:17    TPro  7.1  /  Alb  4.0  /  TBili  0.3  /  DBili  <0.2  /  AST  19  /  ALT  19  /  AlkPhos  82  03-14          Urinalysis Basic - ( 14 Mar 2025 07:17 )    Color: x / Appearance: x / SG: x / pH: x  Gluc: 153 mg/dL / Ketone: x  / Bili: x / Urobili: x   Blood: x / Protein: x / Nitrite: x   Leuk Esterase: x / RBC: x / WBC x   Sq Epi: x / Non Sq Epi: x / Bacteria: x

## 2025-03-17 NOTE — CHART NOTE - NSCHARTNOTEFT_GEN_A_CORE
Registered Dietitian Follow-Up     Patient Profile Reviewed                           Yes [X]   No []     Nutrition History Previously Obtained        Yes []  No [X]   - Unable to obtain hx at f/u. Other team members in patient room at time of RD visit.      Pertinent Subjective Information: Per RN, pt with good PO intake on admission. Tolerating meals well     Pertinent Medical Interventions:  79 yo female w PMHx as reviewed in the EMR who presented to the ED w her daughter w c/o increasing AMS confusion and forgetfulness   #Metabolic Encephalopathy  Likely secondary to COVID-19 >>resolved   Saturating well on RA   # Focal hypodensity within the right head of caudate, may reflect age indeterminate lacunar infarct.  # Sinus mucosal disease most notable in the ethmoid sinuses.  - MR brain: No acute infarct, acute intracranial hemorrhage, or mass effect.  -ID consult appreciated>>Continue RDV and isolation precautions      Diet order:   Diet, Regular:   Consistent Carbohydrate {No Snacks} (CSTCHO)  DASH/TLC {Sodium & Cholesterol Restricted} (DASH) (25 @ 00:09) [Active]    Anthropometrics:  Height (cm): 152.4 (25 @ 00:26)  Weight (kg): 52 (25 @ 00:26)  BMI (kg/m2): 22.4 (25 @ 00:26)  IBW: 45.5 kg    MEDICATIONS  (STANDING):  chlorhexidine 2% Cloths 1 Application(s) Topical <User Schedule>  dextrose 5%. 1000 milliLiter(s) (50 mL/Hr) IV Continuous <Continuous>  dextrose 5%. 1000 milliLiter(s) (100 mL/Hr) IV Continuous <Continuous>  dextrose 50% Injectable 25 Gram(s) IV Push once  dextrose 50% Injectable 12.5 Gram(s) IV Push once  dextrose 50% Injectable 25 Gram(s) IV Push once  gabapentin 300 milliGRAM(s) Oral three times a day  glucagon  Injectable 1 milliGRAM(s) IntraMuscular once  insulin lispro (ADMELOG) corrective regimen sliding scale   SubCutaneous three times a day before meals  insulin lispro (ADMELOG) corrective regimen sliding scale   SubCutaneous at bedtime  oxybutynin 5 milliGRAM(s) Oral daily    MEDICATIONS  (PRN):  acetaminophen     Tablet .. 650 milliGRAM(s) Oral every 6 hours PRN Temp greater or equal to 38C (100.4F), Mild Pain (1 - 3)  aluminum hydroxide/magnesium hydroxide/simethicone Suspension 30 milliLiter(s) Oral every 4 hours PRN Dyspepsia  dextrose Oral Gel 15 Gram(s) Oral once PRN Blood Glucose LESS THAN 70 milliGRAM(s)/deciliter  melatonin 3 milliGRAM(s) Oral at bedtime PRN Insomnia  ondansetron Injectable 4 milliGRAM(s) IV Push every 8 hours PRN Nausea and/or Vomiting  pantoprazole    Tablet 40 milliGRAM(s) Oral before breakfast PRN for heartburn    Pertinent Labs:  @ 06:43: Na --, BUN --, Cr 1.1, BG --, K+ --, Phos --, Mg --, Alk Phos 81, ALT/SGPT 12, AST/SGOT 12, HbA1c --    Finger Sticks:  POCT Blood Glucose.: 243 mg/dL ( @ 11:27)  POCT Blood Glucose.: 175 mg/dL ( @ 07:18)  POCT Blood Glucose.: 243 mg/dL ( @ 20:36)  POCT Blood Glucose.: 168 mg/dL ( @ 16:41)    Physical Findings:  - Appearance: AAOx4  - GI function: No reports of n/v. Last BM 3/16 per RN  - Tubes: N/A  - Oral/Mouth cavity: Regular with thin liquids  - Skin: no pressure injuries per flowsheet  - Edema: no edema      Nutrition Requirements: per previous RD assessment   Weight Used: CBW: 52 kg      Estimated Energy Needs    Continue [X]  Adjust []  Energy Recommendations:  30-35 kcal/k- 1820 kcal/day        Estimated Protein Needs    Continue [X]  Adjust []  Protein Recommendations:  1.2-1.4 g/k - 73 gm/day        Estimated Fluid Needs        Continue [X]  Adjust []  Fluid Recommendations:  1 mL/kcal/day     Nutrient Intake: Flowsheet Intake  3/15: 51-75% x2 meals     [X] Previous Nutrition Diagnosis: Moderate PCM            [X] Ongoing          [] Resolved     Nutrition Education: Deferred at this time, other interdisciplinarily team members in room at time of RD visits     Goal/Expected Outcome: Pt to meet >75% of estimated needs in 3-5 days.      Indicator/Monitoring: diet order, energy intake, weights, labs, GI s/s, BG, skin status, NFPE    Recommendation:   1. Continue with current diet order   2. Encourage PO intake prn and assist with meal times prn     Pt at moderate risk     RD to remain available: Bahman Zamora via TEAMS

## 2025-03-17 NOTE — PROGRESS NOTE ADULT - ASSESSMENT
81 yo female w PMHx as reviewed in the EMR who presented to the ED w her daughter w c/o increasing AMS confusion and forgetfulness     Metabolic Encephalopathy  Likely secondary to COVID-19 >>resolved   Saturating well on RA   CT Head: No evidence of acute intracranial pathology.  2.  Focal hypodensity within the right head of caudate, may reflect age indeterminate lacunar infarct.  3.  Sinus mucosal disease most notable in the ethmoid sinuses.  - MR brain: No acute infarct, acute intracranial hemorrhage, or mass effect.  -UA and UCX negative, Ammonia and TSH WNL, F/up Blood cx   -ID consult appreciated>>Continue RDV and isolation precautions   -  Type II DM with neuropathy  + HTN + HLD   Can continue  home metformin, F/up A1C   Continue gabapentin Q 8   SSI protocol, monitor FS and Keep 140-180  Continue Norvasc statin, carb control and DASH  diet     urinary incontinence: continue oxybutynin     DVT ppx: SCD for now   Gi ppx: Protonix   Full Code   Dispo: from home   Discharge was cancelled as guardian could not be reached>>d/c when guardian has been contacted   
81 yo female w PMHx as reviewed in the EMR who presented to the ED w her daughter w c/o increasing AMS confusion and forgetfulness     Metabolic Encephalopathy  Likely secondary to COVID-19 >>resolved   Saturating well on RA   CT Head: No evidence of acute intracranial pathology.  2.  Focal hypodensity within the right head of caudate, may reflect age indeterminate lacunar infarct.  3.  Sinus mucosal disease most notable in the ethmoid sinuses.  - MR brain: No acute infarct, acute intracranial hemorrhage, or mass effect.  -UA and UCX negative, Ammonia and TSH WNL, F/up Blood cx   -ID consult appreciated>>Continue RDV and isolation precautions   -  Type II DM with neuropathy  + HTN + HLD   Can continue  home metformin, F/up A1C   Continue gabapentin Q 8   SSI protocol, monitor FS and Keep 140-180  Continue Norvasc statin, carb control and DASH  diet     urinary incontinence: continue oxybutynin     DVT ppx: SCD for now   Gi ppx: Protonix   Full Code   Dispo: from home   Discharge on hold as guardian could not be reached>>d/c when guardian has been contacted 
81 yo female w PMHx as reviewed in the EMR who presented to the ED w her daughter w c/o increasing AMS confusion and forgetfulness     Metabolic Encephalopathy  Likely secondary to COVID-19   CT Head: No evidence of acute intracranial pathology.  2.  Focal hypodensity within the right head of caudate, may reflect age indeterminate lacunar infarct.  3.  Sinus mucosal disease most notable in the ethmoid sinuses. F/up MR brain for completeness   -UA negative, blood/urine cultures pending, F/up ammonia / B12 / thyroid panel   -Continue RDV, F/up ID eval. no hypoxia   isolation precautions     Type II DM with neuropathy  + HTN + HLD   Can continue  home metformin, F/up A1C   Continue gabapentin Q 8   SSI protocol, monitor FS and Keep 140-180  Continue Norvasc statin, carb control and DASH  diet     urinary incontinence: oxybutynin on board     DVT ppx: SCD for now   Gi ppx: Protonix   Full Code   Dispo: from home   Pending clinical improvement     
81 yo female w PMHx as reviewed in the EMR who presented to the ED w her daughter w c/o increasing AMS confusion and forgetfulness     Metabolic Encephalopathy  Likely secondary to COVID-19   Saturating well on RA   CT Head: No evidence of acute intracranial pathology.  2.  Focal hypodensity within the right head of caudate, may reflect age indeterminate lacunar infarct.  3.  Sinus mucosal disease most notable in the ethmoid sinuses.  - MR brain: No acute infarct, acute intracranial hemorrhage, or mass effect.  -UA and UCX negative, Ammonia and TSH WNL, F/up Blood cx   -ID consult appreciated>>Continue RDV and isolation precautions   -Patient is more alert today, continue to monitor and anticipate D/C in AM     Type II DM with neuropathy  + HTN + HLD   Can continue  home metformin, F/up A1C   Continue gabapentin Q 8   SSI protocol, monitor FS and Keep 140-180  Continue Norvasc statin, carb control and DASH  diet     urinary incontinence: continue oxybutynin     DVT ppx: SCD for now   Gi ppx: Protonix   Full Code   Dispo: from home   D/C in AM   
81 yo female w PMHx as reviewed in the EMR who presented to the ED w her daughter w c/o increasing AMS confusion and forgetfulness     Metabolic Encephalopathy  Likely secondary to COVID-19 >>resolved   Saturating well on RA   CT Head: No evidence of acute intracranial pathology.  2.  Focal hypodensity within the right head of caudate, may reflect age indeterminate lacunar infarct.  3.  Sinus mucosal disease most notable in the ethmoid sinuses.  - MR brain: No acute infarct, acute intracranial hemorrhage, or mass effect.  -UA and UCX negative, Ammonia and TSH WNL, F/up Blood cx   -ID consult appreciated>>Continue RDV and isolation precautions   -  Type II DM with neuropathy  + HTN + HLD   Can continue  home metformin, F/up A1C   Continue gabapentin Q 8   SSI protocol, monitor FS and Keep 140-180  Continue Norvasc statin, carb control and DASH  diet     urinary incontinence: continue oxybutynin     DVT ppx: SCD for now   Gi ppx: Protonix   Full Code   Dispo: from home   Discharge on hold as guardian could not be reached>>d/c when guardian has been contacted

## 2025-03-17 NOTE — PROGRESS NOTE ADULT - NUTRITIONAL ASSESSMENT
This patient has been assessed with a concern for Malnutrition and has been determined to have a diagnosis/diagnoses of Moderate protein-calorie malnutrition.    This patient is being managed with:   Diet Regular-  Consistent Carbohydrate {No Snacks} (CSTCHO)  DASH/TLC {Sodium & Cholesterol Restricted} (DASH)  Entered: Mar 12 2025 12:09AM  
This patient has been assessed with a concern for Malnutrition and has been determined to have a diagnosis/diagnoses of Moderate protein-calorie malnutrition.    This patient is being managed with:   Diet Regular-  Consistent Carbohydrate {No Snacks} (CSTCHO)  DASH/TLC {Sodium & Cholesterol Restricted} (DASH)  Entered: Mar 12 2025 12:09AM  
This patient has been assessed with a concern for Malnutrition and has been determined to have a diagnosis/diagnoses of Moderate protein-calorie malnutrition.

## 2025-03-18 LAB — VIT B1 SERPL-MCNC: 70.5 NMOL/L — SIGNIFICANT CHANGE UP (ref 66.5–200)

## 2025-03-24 DIAGNOSIS — D84.89 OTHER IMMUNODEFICIENCIES: ICD-10-CM

## 2025-03-24 DIAGNOSIS — Z79.84 LONG TERM (CURRENT) USE OF ORAL HYPOGLYCEMIC DRUGS: ICD-10-CM

## 2025-03-24 DIAGNOSIS — U07.1 COVID-19: ICD-10-CM

## 2025-03-24 DIAGNOSIS — E11.40 TYPE 2 DIABETES MELLITUS WITH DIABETIC NEUROPATHY, UNSPECIFIED: ICD-10-CM

## 2025-03-24 DIAGNOSIS — Z96.651 PRESENCE OF RIGHT ARTIFICIAL KNEE JOINT: ICD-10-CM

## 2025-03-24 DIAGNOSIS — E44.0 MODERATE PROTEIN-CALORIE MALNUTRITION: ICD-10-CM

## 2025-03-24 DIAGNOSIS — R32 UNSPECIFIED URINARY INCONTINENCE: ICD-10-CM

## 2025-03-24 DIAGNOSIS — I10 ESSENTIAL (PRIMARY) HYPERTENSION: ICD-10-CM

## 2025-03-24 DIAGNOSIS — G93.41 METABOLIC ENCEPHALOPATHY: ICD-10-CM

## 2025-03-24 DIAGNOSIS — R41.82 ALTERED MENTAL STATUS, UNSPECIFIED: ICD-10-CM

## 2025-05-01 ENCOUNTER — NON-APPOINTMENT (OUTPATIENT)
Age: 80
End: 2025-05-01

## (undated) DEVICE — NDL HYPO SAFE 22G X 1.5" (BLACK)

## (undated) DEVICE — SOL INJ NS 0.9% 500ML 1-PORT

## (undated) DEVICE — SYR LUER LOK 20CC

## (undated) DEVICE — POSITIONER STIRRUP STRAP W SLIP RING 19X3.5"

## (undated) DEVICE — VENODYNE/SCD SLEEVE CALF MEDIUM

## (undated) DEVICE — STRYKER MIXEVAC 3 BONE CEMENT MIXER

## (undated) DEVICE — GOWN XXL

## (undated) DEVICE — Device

## (undated) DEVICE — DRAPE SHOWER CURTAIN ISOLATION

## (undated) DEVICE — SOL IRR BAG NS 0.9% 3000ML

## (undated) DEVICE — MAKO VIZADISC KNEE TRACKING KIT

## (undated) DEVICE — DRAPE IOBAN 33" X 23"

## (undated) DEVICE — DRAPE LIGHT HANDLE COVER (BLUE)

## (undated) DEVICE — ELCTR GROUNDING PAD ADULT COVIDIEN

## (undated) DEVICE — SAW BLADE STRYKER SAGITTAL DUAL CUT 18MMX90MMX1.27MM

## (undated) DEVICE — MAKO CHECKPOINT KIT FEMORAL / TIBIAL

## (undated) DEVICE — GLV 8.5 PROTEXIS (BLUE)

## (undated) DEVICE — TOURNIQUET CUFF 34" DUAL PORT W PLC

## (undated) DEVICE — WARMING BLANKET UPPER ADULT

## (undated) DEVICE — SUT POLYSORB 1 18" UNDYED

## (undated) DEVICE — MAKO DRAPE KIT

## (undated) DEVICE — TAPE SILK 3"

## (undated) DEVICE — HOOD FLYTE STRYKER HELMET SHIELD

## (undated) DEVICE — FOR-ESU VALLEYLAB T7E15008DX: Type: DURABLE MEDICAL EQUIPMENT

## (undated) DEVICE — ELCTR AQUAMANTYS BIPOLAR SEALER 6.0

## (undated) DEVICE — DRAPE TOWEL BLUE 17" X 24"

## (undated) DEVICE — SUT VICRYL 2-0 27" CT-2 UNDYED

## (undated) DEVICE — SUT POLYSORB 2-0 30" GS-10 UNDYED

## (undated) DEVICE — SOL INJ NS 0.9% 100ML

## (undated) DEVICE — SOL IRR POUR H2O 1500ML

## (undated) DEVICE — GLV 8 PROTEXIS (CREAM) MICRO

## (undated) DEVICE — FOR-TOURNIQUET 27679911: Type: DURABLE MEDICAL EQUIPMENT